# Patient Record
Sex: MALE | Race: WHITE
[De-identification: names, ages, dates, MRNs, and addresses within clinical notes are randomized per-mention and may not be internally consistent; named-entity substitution may affect disease eponyms.]

---

## 2017-07-23 ENCOUNTER — HOSPITAL ENCOUNTER (EMERGENCY)
Dept: HOSPITAL 42 - ED | Age: 37
Discharge: HOME | End: 2017-07-23
Payer: MEDICAID

## 2017-07-23 VITALS — HEART RATE: 94 BPM | SYSTOLIC BLOOD PRESSURE: 138 MMHG | DIASTOLIC BLOOD PRESSURE: 89 MMHG

## 2017-07-23 VITALS — RESPIRATION RATE: 18 BRPM | TEMPERATURE: 98.8 F | OXYGEN SATURATION: 98 %

## 2017-07-23 VITALS — BODY MASS INDEX: 25.8 KG/M2

## 2017-07-23 DIAGNOSIS — K29.20: Primary | ICD-10-CM

## 2017-07-23 LAB
ALBUMIN SERPL-MCNC: 4.8 G/DL (ref 3–4.8)
ALBUMIN/GLOB SERPL: 1.4 {RATIO} (ref 1.1–1.8)
ALT SERPL-CCNC: 180 U/L (ref 7–56)
AMYLASE SERPL-CCNC: 108 U/L (ref 35–125)
APPEARANCE UR: CLEAR
AST SERPL-CCNC: 275 U/L (ref 15–59)
BASOPHILS # BLD AUTO: 0.03 K/MM3 (ref 0–2)
BASOPHILS NFR BLD: 0.7 % (ref 0–3)
BILIRUB UR-MCNC: NEGATIVE MG/DL
BUN SERPL-MCNC: 4 MG/DL (ref 7–21)
CALCIUM SERPL-MCNC: 9.5 MG/DL (ref 8.4–10.5)
COLOR UR: YELLOW
EOSINOPHIL # BLD: 0.1 10*3/UL (ref 0–0.7)
EOSINOPHIL NFR BLD: 1.7 % (ref 1.5–5)
ERYTHROCYTE [DISTWIDTH] IN BLOOD BY AUTOMATED COUNT: 12.5 % (ref 11.5–14.5)
GFR NON-AFRICAN AMERICAN: > 60
GLUCOSE UR STRIP-MCNC: NEGATIVE MG/DL
GRANULOCYTES # BLD: 3.2 10*3/UL (ref 1.4–6.5)
GRANULOCYTES NFR BLD: 69.7 % (ref 50–68)
HGB BLD-MCNC: 14.7 GM/DL (ref 14–18)
LEUKOCYTE ESTERASE UR-ACNC: NEGATIVE LEU/UL
LIPASE SERPL-CCNC: 290 U/L (ref 23–300)
LYMPHOCYTES # BLD: 0.7 10*3/UL (ref 1.2–3.4)
LYMPHOCYTES NFR BLD AUTO: 15.7 % (ref 22–35)
MCH RBC QN AUTO: 34.6 PG (ref 25–35)
MCHC RBC AUTO-ENTMCNC: 36.2 G/DL (ref 31–37)
MCV RBC AUTO: 95.5 FL (ref 80–105)
MONOCYTES # BLD AUTO: 0.6 10*3/UL (ref 0.1–0.6)
MONOCYTES NFR BLD: 12.2 % (ref 1–6)
PH UR STRIP: 6.5 [PH] (ref 4.7–8)
PLATELET # BLD: 117 10^3/UL (ref 120–450)
PMV BLD AUTO: 9.2 FL (ref 7–11)
PROT UR STRIP-MCNC: NEGATIVE MG/DL
RBC # BLD AUTO: 4.25 10^6/UL (ref 3.5–6.1)
RBC # UR STRIP: NEGATIVE /UL
SP GR UR STRIP: <= 1.005 (ref 1–1.03)
URINE NITRATE: NEGATIVE
UROBILINOGEN UR STRIP-ACNC: 0.2 E.U./DL
WBC # BLD AUTO: 4.6 10^3/UL (ref 4.5–11)

## 2017-07-23 PROCEDURE — 80345 DRUG SCREENING BARBITURATES: CPT

## 2017-07-23 PROCEDURE — 80053 COMPREHEN METABOLIC PANEL: CPT

## 2017-07-23 PROCEDURE — 83690 ASSAY OF LIPASE: CPT

## 2017-07-23 PROCEDURE — 81003 URINALYSIS AUTO W/O SCOPE: CPT

## 2017-07-23 PROCEDURE — 80358 DRUG SCREENING METHADONE: CPT

## 2017-07-23 PROCEDURE — 96365 THER/PROPH/DIAG IV INF INIT: CPT

## 2017-07-23 PROCEDURE — 82150 ASSAY OF AMYLASE: CPT

## 2017-07-23 PROCEDURE — 83992 ASSAY FOR PHENCYCLIDINE: CPT

## 2017-07-23 PROCEDURE — 80320 DRUG SCREEN QUANTALCOHOLS: CPT

## 2017-07-23 PROCEDURE — 85025 COMPLETE CBC W/AUTO DIFF WBC: CPT

## 2017-07-23 PROCEDURE — 96375 TX/PRO/DX INJ NEW DRUG ADDON: CPT

## 2017-07-23 PROCEDURE — 80324 DRUG SCREEN AMPHETAMINES 1/2: CPT

## 2017-07-23 PROCEDURE — 80349 CANNABINOIDS NATURAL: CPT

## 2017-07-23 PROCEDURE — 80353 DRUG SCREENING COCAINE: CPT

## 2017-07-23 PROCEDURE — 80361 OPIATES 1 OR MORE: CPT

## 2017-07-23 PROCEDURE — 80346 BENZODIAZEPINES1-12: CPT

## 2017-07-23 PROCEDURE — 99283 EMERGENCY DEPT VISIT LOW MDM: CPT

## 2017-07-23 PROCEDURE — 96372 THER/PROPH/DIAG INJ SC/IM: CPT

## 2017-07-23 NOTE — ED PDOC
Arrival/HPI





- General


Chief Complaint: GI Problem


Time Seen by Provider: 07/23/17 11:22


Historian: Patient, Family





- History of Present Illness


Narrative History of Present Illness (Text): 


07/23/17 11:26


Matt Dodge is a 36 year old male, whose past medical history  includes 

hypertension, presents to the emergency department complaining of constant 

vomiting for the past few days. He notes his vomit is non-bilious, non-bloody 

vomiting, and body tremors. Patient states that on average he drinks 12 beers a 

day (24 oz. each). The patient also reports drinking an hour before prior to 

arrival and had alcohol breath. He states he feels no pain in his abdomen and 

his hands shake when he does not drink. Patient denies chest pain, shortness of 

breath, headache, fever, chills, cough, diarrhea, changes in bowel habits, 

dysuria, hematuria, frequency, flank pain, or other complaints. 


Time/Duration: Other (a few days)


Symptom Onset: Sudden


Symptom Course: Unchanged


Modifying Factors (Text): 


body tremors


Associated Symptoms (Text): 


None





Past Medical History





- Provider Review


Nursing Documentation Reviewed: Yes





- Infectious Disease


Hx of Infectious Diseases: None





- Tetanus Immunization


Tetanus Immunization: Unknown





- Cardiac


Hx Cardiac Disorders: Yes (Palpitations)


Hx Hypertension: Yes





- Pulmonary


Hx Respiratory Disorders: No


Other/Comment: PHARYNGITIS





- Neurological


Hx Neurological Disorder: No





- HEENT


Hx HEENT Disorder: No





- Renal


Hx Renal Disorder: No





- Endocrine/Metabolic


Hx Endocrine Disorders: No





- Hematological/Oncological


Hx Blood Disorders: No





- Integumentary


Hx Dermatological Disorder: No





- Musculoskeletal/Rheumatological


Hx Musculoskeletal Disorders: No


Hx Falls: No





- Gastrointestinal


Hx Gastrointestinal Disorders: No





- Genitourinary/Gynecological


Hx Genitourinary Disorders: No





- Psychiatric


Hx Anxiety: Yes


Hx Substance Use: No (DENIES)


Other/Comment: ETOH ABUSE 10 BEERS A DAY





- Past Surgical History


Past Surgical History: No Previous





- Anesthesia


Hx Anesthesia: No


Hx Anesthesia Reactions: No


Hx Malignant Hyperthermia: No





- Suicidal Assessment


Feels Threatened In Home Enviroment: No





Family/Social History





- Physician Review


Nursing Documentation Reviewed: Yes


Family/Social History: No Known Family HX


Smoking Status: Former Smoker


Hx Alcohol Use: Yes (10 beers a day)


Frequency of alcohol use: Daily


Hx Substance Use: No (DENIES)


Hx Substance Use Treatment: No





Allergies/Home Meds


Allergies/Adverse Reactions: 


Allergies





No Known Allergies Allergy (Verified 07/23/17 11:18)


 








Home Medications: 


 Home Meds











 Medication  Instructions  Recorded  Confirmed


 


Folic Acid [Folic Acid] 1 mg PO DAILY 07/23/17 07/23/17


 


Lisinopril [Zestril] 20 mg PO DAILY 07/23/17 07/23/17














Review of Systems





- Review of Systems


Constitutional: absent: Fevers


Eyes: Normal


ENT: Normal


Respiratory: absent: SOB, Cough


Cardiovascular: Normal.  absent: Chest Pain


Gastrointestinal: Vomiting.  absent: Abdominal Pain, Diarrhea


Genitourinary Male: Normal


Musculoskeletal: Normal


Skin: Normal


Neurological: absent: Headache


Endocrine: absent: Diaphoresis


Hemo/Lymphatic: Normal


Psychiatric: Other (ETOH abuse)





Physical Exam


Vital Signs Reviewed: Yes


Vital Signs











  Temp Pulse Resp BP Pulse Ox


 


 07/23/17 12:53   94 H  18  138/89  98


 


 07/23/17 11:15  98.8 F  104 H  18  147/98 H  98











Temperature: Afebrile


Pulse: Tachycardic


Respiratory Rate: Normal


Appearance: Positive for: Well-Appearing, Comfortable


Pain Distress: None


Mental Status: Positive for: Alert and Oriented X 3





- Systems Exam


Head: Present: Atraumatic, Normocephalic


Pupils: Present: PERRL


Extroacular Muscles: Present: EOMI


Conjunctiva: Present: Normal


Mouth: Present: Moist Mucous Membranes


Neck: Present: Normal Range of Motion


Respiratory/Chest: Present: Clear to Auscultation, Good Air Exchange.  No: 

Respiratory Distress, Accessory Muscle Use


Cardiovascular: Present: Regular Rate and Rhythm, Normal S1, S2.  No: Murmurs


Abdomen: Present: Normal Bowel Sounds, Other (mild hepatomegaly ).  No: 

Tenderness, Distention, Peritoneal Signs


Upper Extremity: Present: Normal Inspection.  No: Cyanosis, Edema


Lower Extremity: Present: Normal Inspection.  No: Edema


Neurological: Present: GCS=15, CN II-XII Intact, Speech Normal


Skin: Present: Warm, Dry, Normal Color.  No: Rashes


Psychiatric: Present: Alert, Oriented x 3, Normal Insight, Normal Concentration





Medical Decision Making


ED Course and Treatment: 


07/23/17 11:26


Impression:


36 year old male with constant vomiting. 





Differential Diagnosis included but are not limited to:  gastritis vs. 

pancreatitis 





Plan:


-- Urinalysis 


-- Labs


-- Pepcid, Magnesium Sulfate, Sodium Chloride, Vitamin B1, and Zofran 


-- Reassess and disposition





Progress Notes:








- Lab Interpretations


Lab Results: 








 07/23/17 12:00 





 07/23/17 12:00 





 Lab Results





07/23/17 13:12: Urine Opiates Screen Negative, Urine Methadone Screen Negative, 

Ur Barbiturates Screen Negative, Ur Phencyclidine Scrn Negative, Ur 

Amphetamines Screen Negative, U Benzodiazepines Scrn Negative, U Oth Cocaine 

Metabols Negative, U Cannabinoids Screen Negative


07/23/17 13:12: Urine Color Yellow, Urine Appearance Clear, Urine pH 6.5, Ur 

Specific Gravity <= 1.005, Urine Protein Negative, Urine Glucose (UA) Negative, 

Urine Ketones Negative, Urine Blood Negative, Urine Nitrate Negative, Urine 

Bilirubin Negative, Urine Urobilinogen 0.2, Ur Leukocyte Esterase Negative


07/23/17 12:00: Alcohol, Quantitative 294 H


07/23/17 12:00: Sodium 130 L, Potassium 4.3, Chloride 88 L, Carbon Dioxide 25, 

Anion Gap 21 H, BUN 4 L, Creatinine 0.6, Est GFR (African Amer) > 60, Est GFR (

Non-Af Amer) > 60, Random Glucose 90, Calcium 9.5, Total Bilirubin 2.6 H, AST 

275 H,  H, Alkaline Phosphatase 109, Total Protein 8.2, Albumin 4.8, 

Globulin 3.4, Albumin/Globulin Ratio 1.4, Amylase 108, Lipase 290


07/23/17 12:00: WBC 4.6  D, RBC 4.25, Hgb 14.7, Hct 40.6 L, MCV 95.5, MCH 34.6, 

MCHC 36.2, RDW 12.5, Plt Count 117 L, MPV 9.2, Gran % 69.7 H, Lymph % (Auto) 

15.7 L, Mono % (Auto) 12.2 H, Eos % (Auto) 1.7, Baso % (Auto) 0.7, Gran # 3.20, 

Lymph # 0.7 L, Mono # 0.6, Eos # 0.1, Baso # 0.03








I have reviewed the lab results: Yes





- Medication Orders


Current Medication Orders: 











Discontinued Medications





Famotidine (Pepcid)  20 mg IVP STAT STA


   Stop: 07/23/17 11:24


   Last Admin: 07/23/17 12:02  Dose: 20 mg





Sodium Chloride (Sodium Chloride 0.9%)  1,000 mls @ 1,000 mls/hr IV .Q1H STA


   Stop: 07/23/17 12:22


   Last Admin: 07/23/17 12:02  Dose: 1,000 mls/hr





Magnesium Sulfate 2 gm/ Sodium (Chloride)  104 mls @ 102 mls/hr IVPB ONCE ONE


   Stop: 07/23/17 12:35


   Last Admin: 07/23/17 12:21  Dose: 102 mls/hr





Ondansetron HCl (Zofran Inj)  8 mg IVP STAT STA


   Stop: 07/23/17 11:24


   Last Admin: 07/23/17 12:00  Dose: 8 mg





Thiamine HCl (Vitamin B1 Inj)  100 mg IM STAT STA


   Stop: 07/23/17 12:46


   Last Admin: 07/23/17 13:05  Dose: 100 mg











- Scribe Statement


The provider has reviewed the documentation as recorded by the Scribe


07/23/2017


Alanis Arevalo





Provider Scribe Attestation:


All medical record entries made by the Scribe were at my direction and 

personally dictated by me. I have reviewed the chart and agree that the record 

accurately reflects my personal performance of the history, physical exam, 

medical decision making, and the department course for this patient. I have 

also personally directed, reviewed, and agree with the discharge instructions 

and disposition. 





Disposition/Present on Arrival





- Present on Arrival


Any Indicators Present on Arrival: No


History of DVT/PE: No


History of Uncontrolled Diabetes: No


Urinary Catheter: No


History of Decub. Ulcer: No


History Surgical Site Infection Following: None





- Disposition


Have Diagnosis and Disposition been Completed?: Yes


Diagnosis: 


 Alcoholic gastritis





Disposition: HOME/ ROUTINE


Disposition Time: 13:00


Condition: IMPROVED


Discharge Instructions (ExitCare):  Gastritis (ED), Abuse of Alcohol (ED)


Additional Instructions: 





Thank you for letting us take care of you today. Your provider was Dr. Saldivar. You were treated for alcohol gastritis. The emergency medical care 

you received today was directed at your acute symptoms. If you were prescribed 

any medication, please fill it and take as directed. It may take several days 

for your symptoms to resolve. Return to the Emergency Department if your 

symptoms worsen, do not improve, or if you have any other problems.





Please contact your doctor or call one of the physicians/clinics you have been 

referred to that are listed on the Patient Visit Information form that is 

included in your discharge packet. Bring any paperwork you were given at 

discharge with you along with any medications you are taking to your follow up 

visit. Our treatment cannot replace ongoing medical care by a primary care 

provider (PCP) outside of the emergency department.





Thank you for allowing the [facility] team to be part of your care today.














Follow up with your doctor in 2-3 days for re-evaluation and further management.





You need to decrease the amount of alcohol you drink daily.  Consider detox 

programs or AA.


Referrals: 


[facility], [Non-Staff] - Follow up with primary

## 2017-09-05 ENCOUNTER — HOSPITAL ENCOUNTER (INPATIENT)
Dept: HOSPITAL 42 - ED | Age: 37
LOS: 6 days | Discharge: HOME | DRG: 750 | End: 2017-09-11
Attending: INTERNAL MEDICINE | Admitting: INTERNAL MEDICINE
Payer: MEDICAID

## 2017-09-05 VITALS — BODY MASS INDEX: 25 KG/M2

## 2017-09-05 DIAGNOSIS — K52.9: ICD-10-CM

## 2017-09-05 DIAGNOSIS — I10: ICD-10-CM

## 2017-09-05 DIAGNOSIS — R30.0: ICD-10-CM

## 2017-09-05 DIAGNOSIS — E87.6: ICD-10-CM

## 2017-09-05 DIAGNOSIS — Z78.1: ICD-10-CM

## 2017-09-05 DIAGNOSIS — Z87.891: ICD-10-CM

## 2017-09-05 DIAGNOSIS — Z91.19: ICD-10-CM

## 2017-09-05 DIAGNOSIS — F41.9: ICD-10-CM

## 2017-09-05 DIAGNOSIS — B96.1: ICD-10-CM

## 2017-09-05 DIAGNOSIS — F10.231: Primary | ICD-10-CM

## 2017-09-05 DIAGNOSIS — N30.00: ICD-10-CM

## 2017-09-05 LAB
ALBUMIN/GLOB SERPL: 1.6 {RATIO} (ref 1.1–1.8)
ALP SERPL-CCNC: 96 U/L (ref 38–126)
ALT SERPL-CCNC: 75 U/L (ref 7–56)
AST SERPL-CCNC: 95 U/L (ref 17–59)
BASOPHILS # BLD AUTO: 0.02 K/MM3 (ref 0–2)
BASOPHILS NFR BLD: 0.4 % (ref 0–3)
BILIRUB SERPL-MCNC: 2.9 MG/DL (ref 0.2–1.3)
BUN SERPL-MCNC: 4 MG/DL (ref 7–21)
CALCIUM SERPL-MCNC: 9.7 MG/DL (ref 8.4–10.5)
CHLORIDE SERPL-SCNC: 88 MMOL/L (ref 98–107)
CO2 SERPL-SCNC: 22 MMOL/L (ref 21–33)
EOSINOPHIL # BLD: 0 10*3/UL (ref 0–0.7)
EOSINOPHIL NFR BLD: 0.2 % (ref 1.5–5)
ERYTHROCYTE [DISTWIDTH] IN BLOOD BY AUTOMATED COUNT: 12.4 % (ref 11.5–14.5)
GLOBULIN SER-MCNC: 3.2 GM/DL
GLUCOSE SERPL-MCNC: 89 MG/DL (ref 70–110)
GRANULOCYTES # BLD: 3.86 10*3/UL (ref 1.4–6.5)
GRANULOCYTES NFR BLD: 71.7 % (ref 50–68)
HCT VFR BLD CALC: 39.1 % (ref 42–52)
LIPASE SERPL-CCNC: 209 U/L (ref 23–300)
LYMPHOCYTES # BLD: 0.8 10*3/UL (ref 1.2–3.4)
LYMPHOCYTES NFR BLD AUTO: 14.5 % (ref 22–35)
MCH RBC QN AUTO: 35.4 PG (ref 25–35)
MCHC RBC AUTO-ENTMCNC: 37.6 G/DL (ref 31–37)
MCV RBC AUTO: 94.2 FL (ref 80–105)
MONOCYTES # BLD AUTO: 0.7 10*3/UL (ref 0.1–0.6)
MONOCYTES NFR BLD: 13.2 % (ref 1–6)
PLATELET # BLD: 139 10^3/UL (ref 120–450)
PMV BLD AUTO: 8.8 FL (ref 7–11)
POTASSIUM SERPL-SCNC: 3.8 MMOL/L (ref 3.6–5)
PROT SERPL-MCNC: 8.2 G/DL (ref 5.8–8.3)
SODIUM SERPL-SCNC: 130 MMOL/L (ref 132–148)
WBC # BLD AUTO: 5.4 10^3/UL (ref 4.5–11)

## 2017-09-05 NOTE — ED PDOC
Arrival/HPI





<Mark Arora - Last Filed: 09/05/17 21:27>





<Arthur Schilling - Last Filed: 09/06/17 00:16>





- General


Chief Complaint: Alcohol Ingestion


Time Seen by Provider: 09/05/17 19:44





- History of Present Illness


Narrative History of Present Illness (Text): 





09/05/17 20:16


36 year old male with hx of alcohol abuse presents to the ED with CC of 

abdominal pain. The patient started yesterday when the patient woke up around 

10am. The pain is described as cramping and squeezing in nature. The pain has 

been constant since yesterday and worsening in pain. The patient states that 

the pain is located in the epigastric area, radiating to his lower abdomen, 

back and right shoulder. Patient reports that he normally drinks 10 beers a day

, but was only able to drink 5 today because he has been vomiting. He has not 

been able to keep anything down since yesterday. He attempted to eat today but 

vomited every time he ate today. He states that his vomit contains only his food

, denies any blood or coffee ground emesis. Patient reports multiple episodes 

of watery diarrhea since yesterday, denies blood. Patient reports subjective 

fever and chills. He also reports tremors, "I shake when I don't drink." 





PMH: HTN, Alcohol abuse (Mark Arora)





Past Medical History





- Provider Review


Nursing Documentation Reviewed: Yes





- Infectious Disease


Hx of Infectious Diseases: None





- Tetanus Immunization


Tetanus Immunization: Unknown





- Cardiac


Hx Cardiac Disorders: Yes (Palpitations)


Hx Hypertension: Yes





- Pulmonary


Hx Respiratory Disorders: No


Other/Comment: PHARYNGITIS





- Neurological


Hx Neurological Disorder: No





- HEENT


Hx HEENT Disorder: No





- Renal


Hx Renal Disorder: No





- Endocrine/Metabolic


Hx Endocrine Disorders: No





- Hematological/Oncological


Hx Blood Disorders: No





- Integumentary


Hx Dermatological Disorder: No





- Musculoskeletal/Rheumatological


Hx Musculoskeletal Disorders: No


Hx Falls: No





- Gastrointestinal


Hx Gastrointestinal Disorders: No





- Genitourinary/Gynecological


Hx Genitourinary Disorders: No





- Psychiatric


Hx Anxiety: Yes


Hx Substance Use: No (DENIES)


Other/Comment: ETOH ABUSE 10 BEERS A DAY





- Past Surgical History


Past Surgical History: No Previous





- Anesthesia


Hx Anesthesia: No


Hx Anesthesia Reactions: No


Hx Malignant Hyperthermia: No





- Suicidal Assessment


Feels Threatened In Home Enviroment: No





<Mark Arora - Last Filed: 09/05/17 21:27>





Family/Social History





- Physician Review


Nursing Documentation Reviewed: Yes


Family/Social History: Unknown Family HX


Smoking Status: Former Smoker


Hx Alcohol Use: Yes (10 beers a day)


Frequency of alcohol use: Daily


Hx Substance Use: No (DENIES)


Hx Substance Use Treatment: No





<Mark Arora - Last Filed: 09/05/17 21:27>





Allergies/Home Meds





<Mark Arora - Last Filed: 09/05/17 21:27>





<Arthur Schilling - Last Filed: 09/06/17 00:16>


Allergies/Adverse Reactions: 


Allergies





No Known Allergies Allergy (Verified 09/05/17 19:35)


 








Home Medications: 


 Home Meds











 Medication  Instructions  Recorded  Confirmed


 


Lisinopril [Zestril] 20 mg PO DAILY 07/23/17 09/05/17


 


Mv-Min/Folic/Vit K/Lycop/Coq10 1 tab PO DAILY 09/05/17 09/05/17





[Daily Multivitamin Capsule]   


 


Thiamine HCl [B-1] 1 tab PO DAILY 09/05/17 09/05/17


 


hydroCHLOROthiazide [Hydrodiuril] 25 mg PO DAILY 09/05/17 09/05/17














Review of Systems





- Physician Review


All systems were reviewed & negative as marked: Yes





- Review of Systems


Constitutional: Fevers (subjective. experiencing chills as well)


Eyes: Normal


ENT: Sore Throat


Respiratory: absent: SOB, Cough


Cardiovascular: absent: Chest Pain, Palpitations, Edema, Syncope


Gastrointestinal: Abdominal Pain (epigastric area, radiating to RLQ/LLQ, back 

and right shoulder), Diarrhea, Nausea, Vomiting.  absent: Constipation, 

Hematochezia, Hematemesis


Genitourinary Male: Normal


Musculoskeletal: Normal


Skin: Normal


Neurological: Normal


Endocrine: Normal


Psychiatric: Depression





<Mark Arora - Last Filed: 09/05/17 21:27>





Physical Exam


Vital Signs Reviewed: Yes


Temperature: Afebrile


Blood Pressure: Normal


Pulse: Regular


Respiratory Rate: Normal


Appearance: Positive for: Uncomfortable (actively dry heaving )


Pain Distress: None


Mental Status: Positive for: Alert and Oriented X 3





- Systems Exam


Head: Present: Atraumatic, Normocephalic


Pupils: Present: PERRL


Extroacular Muscles: Present: EOMI


Conjunctiva: Present: Icteric


Mouth: Present: Moist Mucous Membranes


Nose (External): Present: Atraumatic.  No: Abrasion


Neck: Present: Normal Range of Motion.  No: MIDLINE TENDERNESS, JVD


Respiratory/Chest: Present: Clear to Auscultation.  No: Respiratory Distress, 

Accessory Muscle Use


Cardiovascular: Present: Regular Rate and Rhythm, Normal S1, S2


Abdomen: Present: Tenderness (diffuse), Guarding.  No: Distention, Peritoneal 

Signs, McBurney's Point Tender, Rovsing's Sign Present


Back: No: CVA Tenderness


Upper Extremity: Present: Normal Inspection, NORMAL PULSES, Other (Tremors with 

hands extended. ).  No: Edema


Lower Extremity: Present: Normal Inspection, NORMAL PULSES.  No: Edema, CALF 

TENDERNESS


Neurological: Present: CN II-XII Intact, Speech Normal, Motor Func Grossly 

Intact (5/5 strength UE/LE b/l), Normal Cerebellar Funct (normal finger to nose

, heel to shin)


Skin: Present: Warm, Dry, Normal Color


Psychiatric: Present: Alert, Oriented x 3, Normal Insight, Normal Concentration

, Depressed Mood ("I drink because I'm depressed"), Intoxicated (Reports 

drinking 5 beers today)





<Mark Arora - Last Filed: 09/05/17 21:27>





Medical Decision Making





<Mark Arora - Last Filed: 09/05/17 21:27>





<Arthur Schilling - Last Filed: 09/06/17 00:16>


ED Course and Treatment: 





09/05/17 20:39





Abdominal Pain


   - Lipase - 209 - normal


   - Alcohol - 74 - patient actively shaking. 


   - Ativan 2mg IVP given





 (Mark Arora)








09/05/17 20:45


Pt. seen and evaluated with the medical resident.Agree with HPI,clinical 

assesssment,evaluation and treatment plan.


09/05/17 21:41


Pt. with signs/symptoms of alcohol withdrawal.Ativan was given.Case was 

discussed with medical resident and house MD Barclay who accepts to 

hospitalist service.


 (Arthur Schilling)





- Lab Interpretations


Lab Results: 








 09/05/17 20:09 





 09/05/17 20:09 





 Lab Results





09/05/17 20:09: Alcohol, Quantitative 74 H


09/05/17 20:09: Sodium 130 L, Potassium 3.8, Chloride 88 L, Carbon Dioxide 22, 

Anion Gap 24 H, BUN 4 L, Creatinine 0.7, Est GFR (African Amer) > 60, Est GFR (

Non-Af Amer) > 60, Random Glucose 89, Calcium 9.7, Total Bilirubin 2.9 H, AST 

95 H, ALT 75 H, Alkaline Phosphatase 96, Total Protein 8.2, Albumin 5.0 H, 

Globulin 3.2, Albumin/Globulin Ratio 1.6, Lipase 209


09/05/17 20:09: WBC 5.4, RBC 4.15, Hgb 14.7, Hct 39.1 L, MCV 94.2, MCH 35.4 H, 

MCHC 37.6 H, RDW 12.4, Plt Count 139, MPV 8.8, Gran % 71.7 H, Lymph % (Auto) 

14.5 L, Mono % (Auto) 13.2 H, Eos % (Auto) 0.2 L, Baso % (Auto) 0.4, Gran # 3.86

, Lymph # 0.8 L, Mono # 0.7 H, Eos # 0.0, Baso # 0.02











- Medication Orders


Current Medication Orders: 








Famotidine (Pepcid)  20 mg PO DAILY JOVANA


Folic Acid (Folic Acid)  1 mg PO DAILY JOVANA


Sodium Chloride (Sodium Chloride 0.9%)  1,000 mls @ 125 mls/hr IV .Q8H JOVANA


Lorazepam (Ativan)  1 mg IVP Q2H PRN; Protocol


   PRN Reason: Symptoms of alcohol withdrawl


Multivitamins/Vitamin C (Multi-Delyn Liquid)  15 ml PO 0800 JOVANA


Thiamine HCl (Vitamin B1 Tab)  50 mg PO DAILY JOVANA





Discontinued Medications





Famotidine (Pepcid)  20 mg IVP STAT STA


   Stop: 09/05/17 20:16


   Last Admin: 09/05/17 20:41  Dose: 20 mg





Multivitamins/Vitamin C 10 ml/Thiamine HCl 100 mg/ Folic Acid 1 mg/ Sodium 

Chloride  1,011.2 mls @ 1,000 mls/hr IV .Q1H1M ONE


   Stop: 09/05/17 21:13


   Last Admin: 09/05/17 21:18  Dose: 1,000 mls/hr





Lorazepam (Ativan)  2 mg IVP ONCE ONE


   PRN Reason: Protocol


   Stop: 09/05/17 20:13


   Last Admin: 09/05/17 20:41  Dose: 2 mg





Ondansetron HCl (Zofran Inj)  4 mg IVP STAT STA


   Stop: 09/05/17 20:16


   Last Admin: 09/05/17 20:41  Dose: 4 mg











- PA / NP / Resident Statement


CINDI has reviewed & agrees with the documentation as recorded.


CINDI has examined the patient and agrees with the treatment plan.





<Arthur Schilling - Last Filed: 09/06/17 00:16>





Disposition/Present on Arrival





- Present on Arrival


History of DVT/PE: No


History of Uncontrolled Diabetes: No


Urinary Catheter: No


History of Decub. Ulcer: No


History Surgical Site Infection Following: None





<Mark Arora - Last Filed: 09/05/17 21:27>





- Present on Arrival


Any Indicators Present on Arrival: No


History of DVT/PE: No


History of Uncontrolled Diabetes: No


Urinary Catheter: No


History of Decub. Ulcer: No


History Surgical Site Infection Following: None





- Disposition


Have Diagnosis and Disposition been Completed?: Yes


Disposition Time: 21:41


Patient Plan: Observation





<Arthur Schilling - Last Filed: 09/06/17 00:16>





- Disposition


Diagnosis: 


 Alcohol withdrawal





Disposition: HOSPITALIZED


Patient Problems: 


 Current Active Problems











Problem Status Onset


 


Alcohol withdrawal Chronic  











Condition: STABLE


Referrals: 


[facility] Mary Valenzuela, [Primary Care Provider] - Follow up with primary


Forms:  [employer] (English)

## 2017-09-06 LAB
APPEARANCE UR: (no result)
BACTERIA #/AREA URNS HPF: (no result) /[HPF]
BILIRUB UR-MCNC: NEGATIVE MG/DL
COLOR UR: (no result)
EPI CELLS #/AREA URNS HPF: (no result) /HPF (ref 0–5)
GLUCOSE UR STRIP-MCNC: NEGATIVE MG/DL
KETONES UR STRIP-MCNC: (no result) MG/DL
LEUKOCYTE ESTERASE UR-ACNC: (no result) LEU/UL
MAGNESIUM SERPL-MCNC: 1.7 MG/DL (ref 1.7–2.2)
PH UR STRIP: 7 [PH] (ref 4.7–8)
PHOSPHATE SERPL-MCNC: 3.4 MG/DL (ref 2.5–4.5)
PROT UR STRIP-MCNC: NEGATIVE MG/DL
RBC # UR STRIP: (no result) /UL
RBC #/AREA URNS HPF: (no result) /HPF (ref 0–2)
SP GR UR STRIP: <= 1.005 (ref 1–1.03)
UROBILINOGEN UR STRIP-ACNC: 0.2 E.U./DL

## 2017-09-06 NOTE — CARD
--------------- APPROVED REPORT --------------





EKG Measurement

Heart [id number]

OR [id number]

[id number]

[id number]

[id number]



<Conclusion>

Sinus rhythm with occasional premature ventricular complexes

Otherwise normal ECG

## 2017-09-06 NOTE — CP.PCM.PN
<Kobe Allison - Last Filed: 09/06/17 15:41>





Subjective





- Date & Time of Evaluation


Date of Evaluation: 09/06/17


Time of Evaluation: 10:32





- Subjective


Subjective: 


Patient was seen and examined at bedside. He reports feeling anxious, and still 

feels tremulous. He states that he has tried to quit drinking in the past, 

however, he continues to drink due to depression and anxiety. He denies 

additional nausea, vomiting, diarrhea, fevers, chills, blurry vision, CP, SOB, 

cough, palpitations, dizziness, weakness, leg swelling, dysuria, or other 

complaints. 





PMH: HTN, Alcohol abuse 


PSH: denies 


All: NKDA 


Meds: Reviewed 


Social history: Drinks beer daily, quit smoking, denies illicit drugs, lives 

with a friend, ambulates by self, able to normally perform ADLs independently, 

unemployed








Objective





- Vital Signs/Intake and Output


Vital Signs (last 24 hours): 


 











Temp Pulse Resp BP Pulse Ox


 


 98.2 F   103 H  20   116/81   98 


 


 09/06/17 11:55  09/06/17 11:55  09/06/17 11:55  09/06/17 11:55  09/06/17 06:00








Intake and Output: 


 











 09/06/17 09/06/17





 06:59 18:59


 


Intake Total 500 0


 


Output Total 350 500


 


Balance 150 -500














- Medications


Medications: 


 Current Medications





Famotidine (Pepcid)  20 mg PO DAILY [facility]


   Last Admin: 09/06/17 12:59 Dose:  Not Given


Folic Acid (Folic Acid)  1 mg PO DAILY [facility]


   Last Admin: 09/06/17 12:59 Dose:  Not Given


Multivitamins/Vitamin C 10 ml/Thiamine HCl 100 mg/ Folic Acid 1 mg/ Sodium 

Chloride  1,011.2 mls @ 100 mls/hr IV .Q10H7M ONE


   Stop: 09/06/17 22:09


   Last Admin: 09/06/17 14:18 Dose:  100 mls/hr


Lorazepam (Ativan)  2 mg IVP Q2H PRN; Protocol


   PRN Reason: Symptoms of alcohol withdrawl


Ondansetron HCl (Zofran Inj)  2 mg IVP Q4H PRN


   PRN Reason: Nausea/Vomiting











- Head Exam


Head Exam: ATRAUMATIC, NORMAL INSPECTION, NORMOCEPHALIC





- Eye Exam


Eye Exam: EOMI, Normal appearance, PERRL.  absent: Scleral icterus


Pupil Exam: NORMAL ACCOMODATION, PERRL





- ENT Exam


ENT Exam: Mucous Membranes Moist





- Neck Exam


Neck Exam: Full ROM, Normal Inspection





- Respiratory Exam


Respiratory Exam: Clear to Ausculation Bilateral, NORMAL BREATHING PATTERN.  

absent: Chest Wall Tenderness, Respiratory Distress





- Cardiovascular Exam


Cardiovascular Exam: REGULAR RHYTHM, +S1, +S2.  absent: JVD, Rubs





- GI/Abdominal Exam


GI & Abdominal Exam: Soft, Normal Bowel Sounds.  absent: Rigid, Tenderness





- Extremities Exam


Extremities Exam: Normal Inspection.  absent: Joint Swelling





- Back Exam


Back Exam: NORMAL INSPECTION, paraspinal tenderness





- Psychiatric Exam


Psychiatric exam: Anxious





- Skin


Skin Exam: Dry, Intact, Normal Color





Assessment and Plan





- Assessment and Plan (Free Text)


Assessment: 





Alcohol withdrawal ass/w anxiety


-Patient a chronic alcoholic.


-Multiple admissions with alcohol.


-Ativan 2mg PRN and CIWA score now 2(Original 7 upon admission).


-Continue Banana bag, folate.


-Discussed with patient if he was ready to quit drinking.  Patient is open to 

get help for his drinking problem.  Will discuss more with the patient tomorrow.


-Fall protocols and Seizure protocols.





Dysuria


-U/A showed (+)Leukocyte Esterase and blood. Urine Culture Pending.


-Start Rocephin





Nausea and vomiting 2/2 likely to gastroenteritis


-Patient received 1L NS banana bag and zofran in ED.


-Patient symptoms resolved.  Abdominal exam benign.


-Phosphorus(3.4) and Magnesium (1.7)


-Continue Zofran PRN.





Transaminitis


-most likely due to chronic alcohol abuse.


-AST:95 and ALT:75





Hypertension


-continue home medications





GI ppx


-Continue Protonix





DVT ppx


-SCD's





<Obdulio Irene - Last Filed: 09/06/17 17:59>





Objective





- Vital Signs/Intake and Output


Vital Signs (last 24 hours): 


 











Temp Pulse Resp BP Pulse Ox


 


 98.2 F   103 H  20   116/81   98 


 


 09/06/17 11:55  09/06/17 11:55  09/06/17 11:55  09/06/17 11:55  09/06/17 06:00








Intake and Output: 


 











 09/06/17 09/06/17





 06:59 18:59


 


Intake Total 500 0


 


Output Total 350 500


 


Balance 150 -500














- Medications


Medications: 


 Current Medications





Famotidine (Pepcid)  20 mg PO DAILY [facility]


   Last Admin: 09/06/17 12:59 Dose:  Not Given


Folic Acid (Folic Acid)  1 mg PO DAILY [facility]


   Last Admin: 09/06/17 12:59 Dose:  Not Given


Multivitamins/Vitamin C 10 ml/Thiamine HCl 100 mg/ Folic Acid 1 mg/ Sodium 

Chloride  1,011.2 mls @ 100 mls/hr IV .Q10H7M ONE


   Stop: 09/06/17 22:09


   Last Admin: 09/06/17 14:18 Dose:  100 mls/hr


Lorazepam (Ativan)  2 mg IVP Q2H PRN; Protocol


   PRN Reason: Symptoms of alcohol withdrawl


   Last Admin: 09/06/17 16:45 Dose:  2 mg


Lorazepam (Ativan)  2 mg IVP Q6H JOVANA


   PRN Reason: Protocol


   Last Admin: 09/06/17 17:30 Dose:  Not Given


Ondansetron HCl (Zofran Inj)  2 mg IVP Q4H PRN


   PRN Reason: Nausea/Vomiting











Attending/Attestation





- Attestation


I have personally seen and examined this patient.: Yes


I have fully participated in the care of the patient.: Yes


I have reviewed all pertinent clinical information, including history, physical 

exam and plan: Yes


Notes (Text): 





09/06/17 17:57





attending note;





Patient seen and examined with the resident.





Patient is a 36-year-old well-known to our service is admitted to alcohol 

withdrawal.


Continue IV banana bag and IV Ativan.


[facility] protocol.


Depression; psychiatric evaluation requested.


 evaluation requested for AA rehabilition/AA meeting  information.


Noncompliance with follow up.





Diagnosis and treatment plan discussed with patient's significant other in 

detail.

## 2017-09-06 NOTE — CP.PCM.HP
<BETYAFTAB - Last Filed: 09/06/17 00:15>





History of Present Illness





- History of Present Illness


History of Present Illness: 


Aftab Rodriguez, PGY1, H&P for Dr. Blas:





CC: diffuse abdominal pain





HPI: 35M with PMH HTN, alcoholism, presents for diffuse abdominal pain x 2 days 

PTA. He descirbes it crampy, squeezing, constant, denies radiation, worsening 

today, with associated multiple nonbloody, nonbilious vomiting and watery 

diarrhea today.  Vomiting occurs right after food intake, has chronic + 

tremors. Denies fever, chills, blurry vision, cp, sob, cough, palpitations, 

dizziness, weakness, leg swelling. he c/o dysuria, denies frequency, urgency, 

hematuria, or back pain.





[place] [facility] [place].


PMD:  Dr. Vanessa


PMH:  hypertension, alcohol abuse


PSH:  denies 


All: NKA


Home Medications:  reviewed


Social History:  lives with friend; ambulates by self, able to do his ADLs, 

quit smoking 8 months ago; drinks 10 beers daily,denies illicit drug use; 

unemployed








Present on Admission





- Present on Admission


Any Indicators Present on Admission: No


History of DVT/PE: No


History of Uncontrolled Diabetes: No


Urinary Catheter: No


Decubitus Ulcer Present: No


History Surgical Site Infection Following: None





Review of Systems





- Review of Systems


All systems: reviewed and no additional remarkable complaints except


Review of Systems: 





as per hpi





Past Patient History





- Infectious Disease


Hx of Infectious Diseases: None





- Tetanus Immunizations


Tetanus Immunization: Unknown





- Past Social History


Smoking Status: Former Smoker





- CARDIAC


Hx Cardiac Disorders: Yes (Palpitations)


Hx Hypertension: Yes





- PULMONARY


Hx Respiratory Disorders: No


Other/Comment: PHARYNGITIS





- NEUROLOGICAL


Hx Neurological Disorder: No





- HEENT


Hx HEENT Problems: No





- RENAL


Hx Chronic Kidney Disease: No





- ENDOCRINE/METABOLIC


Hx Endocrine Disorders: No





- HEMATOLOGICAL/ONCOLOGICAL


Hx Blood Disorders: No





- INTEGUMENTARY


Hx Dermatological Problems: No





- MUSCULOSKELETAL/RHEUMATOLOGICAL


Hx Musculoskeletal Disorders: No


Hx Falls: No





- GASTROINTESTINAL


Hx Gastrointestinal Disorders: No





- GENITOURINARY/GYNECOLOGICAL


Hx Genitourinary Disorders: No





- PSYCHIATRIC


Hx Anxiety: Yes


Hx Substance Use: No (DENIES)


Other/Comment: ETOH ABUSE 10 BEERS A DAY





- SURGICAL HISTORY


Hx Surgeries: No





- ANESTHESIA


Hx Anesthesia: No


Hx Anesthesia Reactions: No


Hx Malignant Hyperthermia: No





Meds


Allergies/Adverse Reactions: 


 Allergies











Allergy/AdvReac Type Severity Reaction Status Date / Time


 


No Known Allergies Allergy   Verified 09/05/17 19:35














Physical Exam





- Constitutional


Appears: Non-toxic, No Acute Distress, Unkempt, Older Than Stated Age





- Head Exam


Head Exam: ATRAUMATIC, NORMOCEPHALIC





- Eye Exam


Eye Exam: EOMI, PERRL.  absent: Conjunctival injection, Scleral icterus


Pupil Exam: NORMAL ACCOMODATION, PERRL





- ENT Exam


ENT Exam: Mucous Membranes Dry





- Neck Exam


Neck exam: Positive for: Normal Inspection.  Negative for: Lymphadenopathy, 

Thyromegaly





- Respiratory Exam


Respiratory Exam: Clear to Auscultation Bilateral, NORMAL BREATHING PATTERN.  

absent: Accessory Muscle Use, Rales, Rhonchi, Wheezes





- Cardiovascular Exam


Cardiovascular Exam: RRR, +S1, +S2.  absent: Bradycardia, Tachycardia, Systolic 

Murmur





- GI/Abdominal Exam


GI & Abdominal Exam: Guarding, Normal Bowel Sounds, Tenderness.  absent: Firm, 

Rebound, Rigid


Additional comments: 


Diffusely TTP, + suprapubic tenderness








- Extremities Exam


Extremities exam: Positive for: normal inspection, pedal pulses present.  

Negative for: calf tenderness, pedal edema





- Back Exam


Back exam: absent: CVA tenderness (L), CVA tenderness (R)





- Neurological Exam


Neurological exam: Alert, Oriented x3





- Psychiatric Exam


Psychiatric exam: Depressed





- Skin


Skin Exam: Dry, Normal Color, Warm





Results





- Vital Signs


Recent Vital Signs: 





 Last Vital Signs











Temp  99.1 F   09/05/17 19:39


 


Pulse  88   09/05/17 19:39


 


Resp  19   09/05/17 19:39


 


BP  127/86   09/05/17 19:39


 


Pulse Ox  98   09/05/17 19:39














- Labs


Result Diagrams: 


 09/05/17 20:09





 09/05/17 20:09


Labs: 





 Laboratory Results - last 24 hr











  09/05/17 09/05/17 09/05/17





  20:09 20:09 20:09


 


WBC  5.4  


 


RBC  4.15  


 


Hgb  14.7  


 


Hct  39.1 L  


 


MCV  94.2  


 


MCH  35.4 H  


 


MCHC  37.6 H  


 


RDW  12.4  


 


Plt Count  139  


 


MPV  8.8  


 


Gran %  71.7 H  


 


Lymph % (Auto)  14.5 L  


 


Mono % (Auto)  13.2 H  


 


Eos % (Auto)  0.2 L  


 


Baso % (Auto)  0.4  


 


Gran #  3.86  


 


Lymph #  0.8 L  


 


Mono #  0.7 H  


 


Eos #  0.0  


 


Baso #  0.02  


 


Sodium   130 L 


 


Potassium   3.8 


 


Chloride   88 L 


 


Carbon Dioxide   22 


 


Anion Gap   24 H 


 


BUN   4 L 


 


Creatinine   0.7 


 


Est GFR ( Amer)   > 60 


 


Est GFR (Non-Af Amer)   > 60 


 


Random Glucose   89 


 


Calcium   9.7 


 


Total Bilirubin   2.9 H 


 


AST   95 H 


 


ALT   75 H 


 


Alkaline Phosphatase   96 


 


Total Protein   8.2 


 


Albumin   5.0 H 


 


Globulin   3.2 


 


Albumin/Globulin Ratio   1.6 


 


Lipase   209 


 


Alcohol, Quantitative    74 H














Assessment & Plan





- Assessment and Plan (Free Text)


Assessment: 


35 year old male with past medical history of chronic ETOH abuse and 

hypertension presents today with complaint of diffuse abdominal pain, multiple 

episodes of nausea and nonbilious, nonbloody vomiting x 1 day (likely 

gastroenteritis) and alcohol withdrawal.





Plan: 





Nausea and vomiting 2/2 likely gastroenteritis:


- Diffuse abdominal tenderness on exam and + suprapubic tenderness as well. C/o 

mild dysuria


- NPO. IVF. Received 1L NS banana bag and zofran in ED. 


- Zofran prn


- f/u Mg, Phosp





Dysuria: 


- Check UA, Urine culture.





Alcohol withdrawal:


- Pt a chronic alcoholic.


- multiple admissions for alcohol withdrawal


- Ativan prn, CIWA protocol


- Received banana bag in ED


- MVT, folate, thiamine q day





Transaminitis:


- Likely secondary to chronic etoh abuse.


- Recent hepatitis panel was negative; Abd US 4/2017 showed severe fatty 

infiltration of liver. 


- Monitor LFTs





HTN: c/w home meds





Patient is on pepcid for GI prophylaxis and SCDs for DVT prophylaxis.





Discussed with Dr. Blas.


Aftab Rodriguez, PGY1








- Date & Time


Date: 09/06/17


Time: 00:21





<Bibiana Blas - Last Filed: 09/06/17 08:09>





Results





- Vital Signs


Recent Vital Signs: 





 Last Vital Signs











Temp  98.8 F   09/06/17 06:00


 


Pulse  85   09/06/17 06:00


 


Resp  20   09/06/17 06:00


 


BP  124/78   09/06/17 06:00


 


Pulse Ox  98   09/06/17 06:00














- Labs


Result Diagrams: 


 09/05/17 20:09





 09/05/17 20:09





Attending/Attestation





- Attestation


I have personally seen and examined this patient.: Yes


I have fully participated in the care of the patient.: Yes


I have reviewed all pertinent clinical information: Yes


Notes (Text): 





09/06/17 08:07


Patient was seen when he was  in bed # 6 in the ER.


Agree with history , physical examination, assessment and  plan.

## 2017-09-07 LAB
ALBUMIN/GLOB SERPL: 1.5 {RATIO} (ref 1.1–1.8)
ALP SERPL-CCNC: 83 U/L (ref 38–126)
ALT SERPL-CCNC: 69 U/L (ref 7–56)
AST SERPL-CCNC: 93 U/L (ref 17–59)
BILIRUB SERPL-MCNC: 3.4 MG/DL (ref 0.2–1.3)
BUN SERPL-MCNC: 6 MG/DL (ref 7–21)
CALCIUM SERPL-MCNC: 9.5 MG/DL (ref 8.4–10.5)
CHLORIDE SERPL-SCNC: 101 MMOL/L (ref 98–107)
CO2 SERPL-SCNC: 19 MMOL/L (ref 21–33)
ERYTHROCYTE [DISTWIDTH] IN BLOOD BY AUTOMATED COUNT: 12.4 % (ref 11.5–14.5)
GLOBULIN SER-MCNC: 3 GM/DL
GLUCOSE SERPL-MCNC: 91 MG/DL (ref 70–110)
HCT VFR BLD CALC: 38.2 % (ref 42–52)
MAGNESIUM SERPL-MCNC: 1.8 MG/DL (ref 1.7–2.2)
MCH RBC QN AUTO: 35.2 PG (ref 25–35)
MCHC RBC AUTO-ENTMCNC: 36.4 G/DL (ref 31–37)
MCV RBC AUTO: 96.7 FL (ref 80–105)
PLATELET # BLD: 114 10^3/UL (ref 120–450)
PMV BLD AUTO: 8.9 FL (ref 7–11)
POTASSIUM SERPL-SCNC: 3.5 MMOL/L (ref 3.6–5)
PROT SERPL-MCNC: 7.6 G/DL (ref 5.8–8.3)
SODIUM SERPL-SCNC: 136 MMOL/L (ref 132–148)
WBC # BLD AUTO: 5.2 10^3/UL (ref 4.5–11)

## 2017-09-07 PROCEDURE — HZ2ZZZZ DETOXIFICATION SERVICES FOR SUBSTANCE ABUSE TREATMENT: ICD-10-PCS

## 2017-09-07 NOTE — PCM.RRT
<JACKELYN AVERY - Last Filed: 09/07/17 01:59>





RRT Nurse Assessment





- Situation


Date: 09/07/17


Time RRT was called: 01:26


RRT Responder Arrival Time: 01:28


RRT Location:: [address] [place] [address]


Room Number: 268


RRT Reason for Call: Tachycardia, Looks Sicker


RRT Called By: RN





- IV


IV Inserted during RRT?: No





- Respiratory


Oxygen Delivery Method: Room Air


Received Nebulizer Treatments:: No


Was the Patient Ventilated with Bag/Mask 100% O2?: No


Secretions Suctioned?: No


Was the Patient Intubated?: No


Was the Patient Placed on a Ventilator?: No





- Medication


Medications Administered During RRT: Geodon 10 mg IM, Librium 50 mg PO





- Diagnostic Test Ordered


EKG: No


Chest X-Ray: No


CT Scan: No





- Stat Labs Ordered


RRT Stat Labs Ordered: CBC


RRT Other Labs Ordered: CMP, MG


CPR started during RRT?: No





- Vital Signs


Vital Sign: 


Rapid Response Vital Sign





Blood Pressure                   138/86


Pulse Rate                       216


Respiratory Rate                 24


Temperature                      99.2 F


Oxygen Saturation                95











- Cicero Coma Scale


Coma Scale Eye Opening: Spontaneous


Coma Scale Motor: Obeys Commands Movement


Coma Scale Verbal: Confused/able to answer





- Time RRT Ended


Time RRT Ended: 01:45





- Vital Signs at end of RRT


Vital Signs at end of RRT: 


Rapid Response End Vital Sign





Blood Pressure                   145/87


Pulse Rate                       103


Respiratory Rate                 18


O2 Sat by Pulse Oximetry         96











- Recommendations


5) RRT Level of Care Recommendations: Remain in current setting





I.Reason for RRT





- A) Acute Change in Patient:


(Select all that apply): Staff member or family is worried about patient, Acute 

change in heart rate less than 50 or greater than 120





- Neurological Status


(Select all that apply): Aggressive





- Respiratory


Oxygen Delivery Method: Room Air





- Constitutional


Appears: Toxic, No Acute Distress, Combative, Agitated


Additional Comments: 





pt was attempting to get out of bed, being restrained by nurses, hospital 

security called and assisted in restraining pt








- Head


Head Exam: ATRAUMATIC, NORMAL INSPECTION, NORMOCEPHALIC





- Eyes


Eye Exam: EOMI, Normal appearance





- Respiratory Exam


Respiratory Exam: NORMAL BREATHING PATTERN.  absent: Accessory Muscle Use, 

Respiratory Distress





- Cardiovascular Exam


Cardiovascular Exam: Tachycardia, REGULAR RHYTHM, +S1, +S2





- GI/Abdominal Exam


GI & Abdominal Exam: Soft.  absent: Distended, Tenderness, Rebound





- Neurological Exam


Neurological Exam: Altered (confusd, pt thinks that he is home ), Awake





- Extremities Exam


Extremities Exam: Full ROM


Additional comments: 





pt was placed in 2-point UE restraints





Plan





- Assessment of Findings&Treatment Plan





RRT was called and team responded STAT. pt was found to be attempting to get 

out of bed and was being restrained by team of nurses. Tavon harris was called and 

hospital security responded. Dr. SIRI You and senior resident were present. 

Patient is admitted for ETOH withdrawal, and was aggressive about leaving the 

hospital and going home, but did not make any threats against nurses, MDs or 

hospital staff; pt was not combative and did not strike any persons. 





Plan: 


- 2-point UE restraints were applied


- Geodon 20mg IM stat was given 


- Librium 50mg PO stat was also ordered 


- Librium 50mg PO Q8H PRN added to standing orders


- Ativan 2mg changed to Q4H JOVANA and Q2H PRN


- vitals at end of RRT were stable and pt less agitated


- will re-evaluate pt later to see if less agitated to remove restraints





Patient evaluated and plan d/w attending, Dr. SIRI Avery PGY1


pager # [phone]





<Cuco You N - Last Filed: 09/07/17 19:27>





RRT Nurse Assessment





- Vital Signs


Vital Sign: 


Rapid Response Vital Sign





Blood Pressure                   138/86


Pulse Rate                       216


Respiratory Rate                 24


Temperature                      99.2 F


Oxygen Saturation                95











- Vital Signs at end of RRT


Vital Signs at end of RRT: 


Rapid Response End Vital Sign





Blood Pressure                   145/87


Pulse Rate                       103


Respiratory Rate                 18


O2 Sat by Pulse Oximetry         96











Addendum


Addendum: 





09/07/17 1:21


pt with DT is agitated and HR increased to 178 . pt is confused , states take 

me to police station.PT is on ativan 2mg q6h and 2mg q2 prn.has recieved ativan 

few minutes back.pt was given placed on [id number] mg q8h and stat dose of 

geodan and lubrium was given ,also changed the ativan to q4 .pt improved hr 

decreased to 96.

## 2017-09-07 NOTE — CP.PCM.PN
Subjective





- Date & Time of Evaluation


Date of Evaluation: 09/07/17


Time of Evaluation: 09:43





- Subjective


Subjective: 





This patient was seen and examined at bedside.  Per documentation, the patient 

had a rapid response called on him for increased agitation and trying to leave 

the hospital.  The patient was subsequently put in 2 point restraints and given 

Geodon and Librium. The patient was reporting some anxiety but denies any chest 

pain, shortness of breath, nausea, vomiting, lightheadedness, dizziness, 

changes in vision, syncopal episodes or any other complaints.





PMH: HTN, Alcohol abuse


PSH: denies


Allergies: NKDA


Medications: Reviewed


Social Hx: Drinks beer daily, Quit smoking, lives with a friend, ambulates by 

self, Able to perfrom ADL's without assistance, Unemployed.





 





Objective





- Vital Signs/Intake and Output


Vital Signs (last 24 hours): 


 











Temp Pulse Resp BP Pulse Ox


 


 99 F   109 H  20   129/92 H  97 


 


 09/07/17 12:00  09/07/17 12:00  09/07/17 12:00  09/07/17 12:00  09/07/17 06:00








Intake and Output: 


 











 09/07/17 09/07/17





 06:59 18:59


 


Intake Total 0 


 


Output Total 500 


 


Balance -500 














- Medications


Medications: 


 Current Medications





Chlordiazepoxide (Librium)  50 mg PO Q8 JOVANA


   PRN Reason: Protocol


   Last Admin: 09/07/17 05:12 Dose:  50 mg


Famotidine (Pepcid)  20 mg PO DAILY [facility]


   Last Admin: 09/07/17 09:35 Dose:  Not Given


Folic Acid (Folic Acid)  1 mg PO DAILY [facility]


   Last Admin: 09/07/17 09:35 Dose:  Not Given


Lorazepam (Ativan)  2 mg IVP Q2H PRN; Protocol


   PRN Reason: Symptoms of alcohol withdrawl


   Last Admin: 09/07/17 06:51 Dose:  2 mg


Lorazepam (Ativan)  2 mg IVP Q4H JOVANA


   PRN Reason: Protocol


   Last Admin: 09/07/17 09:29 Dose:  2 mg


Ondansetron HCl (Zofran Inj)  2 mg IVP Q4H PRN


   PRN Reason: Nausea/Vomiting


Potassium Chloride (Klor-Con 10)  10 meq PO ONCE ONE


   Stop: 09/08/17 08:29











- Labs


Labs: 


 





 09/07/17 02:20 





 09/07/17 02:20 











- Head Exam


Head Exam: ATRAUMATIC, NORMAL INSPECTION, NORMOCEPHALIC





- Eye Exam


Eye Exam: EOMI, Normal appearance, PERRL


Pupil Exam: NORMAL ACCOMODATION, PERRL.  absent: Irregular





- ENT Exam


ENT Exam: Mucous Membranes Moist, Normal Exam





- Neck Exam


Neck Exam: Full ROM, Normal Inspection





- Respiratory Exam


Respiratory Exam: Clear to Ausculation Bilateral, NORMAL BREATHING PATTERN.  

absent: Accessory Muscle Use, Chest Wall Tenderness





- Cardiovascular Exam


Cardiovascular Exam: REGULAR RHYTHM, +S1, +S2.  absent: Gallop, Rubs





- GI/Abdominal Exam


GI & Abdominal Exam: Soft, Normal Bowel Sounds.  absent: Rigid, Tenderness





- Extremities Exam


Extremities Exam: Full ROM, Normal Capillary Refill, Normal Inspection





- Back Exam


Back Exam: NORMAL INSPECTION.  absent: paraspinal tenderness





- Neurological Exam


Neurological Exam: Alert, Awake, CN II-XII Intact





- Psychiatric Exam


Psychiatric exam: Agitated, Anxious.  absent: Normal Affect, Normal Mood





- Skin


Skin Exam: Dry, Intact.  absent: Mottled, Vesicles





Assessment and Plan





- Assessment and Plan (Free Text)


Assessment: 





Alcohol withdrawal ass/w anxiety


-Patient a chronic alcoholic.


-Multiple admissions with alcohol.


-Last night patient had a Rapid Response called on him and was given Geodon and 

Librium and put in 2 point restraints.  Patient remains in restraints. Reasses 

patient in the morning.


-Ativan changed to Q4h JOVANA and CIWA score now 2(Original 7 upon admission).


-Continue Banana bag, folate.


-Discussed with patient if he was ready to quit drinking.  Patient is open to 

get help for his drinking problem.  Will discuss more with the patient tomorrow.


-Fall protocols and Seizure protocols.





UTI


-U/A showed (+)Leukocyte Esterase and blood. Urine Culture Pending.


-Rocephin completed. Patient denying in urinary symptoms currently. 





Nausea and vomiting 2/2 likely to gastroenteritis


-Patient received 1L NS banana bag and zofran in ED.


-Patient symptoms resolved.  Abdominal exam benign.


-Phosphorus(3.4) and Magnesium (1.7)


-Continue Zofran PRN.





Transaminitis


-most likely due to chronic alcohol abuse.


-AST:93( down from 95 on 9/6) and ALT:69(down from 75 on 9/6)





Hypertension


-continue home medications





GI ppx


-Continue Protonix





DVT ppx


-SCD's

## 2017-09-08 LAB
BASOPHILS # BLD AUTO: 0.03 K/MM3 (ref 0–2)
BASOPHILS NFR BLD: 0.4 % (ref 0–3)
BUN SERPL-MCNC: 13 MG/DL (ref 7–21)
CALCIUM SERPL-MCNC: 10.2 MG/DL (ref 8.4–10.5)
CHLORIDE SERPL-SCNC: 105 MMOL/L (ref 98–107)
CO2 SERPL-SCNC: 20 MMOL/L (ref 21–33)
EOSINOPHIL # BLD: 0.1 10*3/UL (ref 0–0.7)
EOSINOPHIL NFR BLD: 0.8 % (ref 1.5–5)
ERYTHROCYTE [DISTWIDTH] IN BLOOD BY AUTOMATED COUNT: 12.8 % (ref 11.5–14.5)
GLUCOSE SERPL-MCNC: 78 MG/DL (ref 70–110)
GRANULOCYTES # BLD: 5.5 10*3/UL (ref 1.4–6.5)
GRANULOCYTES NFR BLD: 69.2 % (ref 50–68)
HCT VFR BLD CALC: 43 % (ref 42–52)
LYMPHOCYTES # BLD: 1.4 10*3/UL (ref 1.2–3.4)
LYMPHOCYTES NFR BLD AUTO: 17.2 % (ref 22–35)
MAGNESIUM SERPL-MCNC: 2.1 MG/DL (ref 1.7–2.2)
MCH RBC QN AUTO: 35.9 PG (ref 25–35)
MCHC RBC AUTO-ENTMCNC: 36.3 G/DL (ref 31–37)
MCV RBC AUTO: 98.9 FL (ref 80–105)
MONOCYTES # BLD AUTO: 1 10*3/UL (ref 0.1–0.6)
MONOCYTES NFR BLD: 12.4 % (ref 1–6)
PLATELET # BLD: 169 10^3/UL (ref 120–450)
PMV BLD AUTO: 9.4 FL (ref 7–11)
POTASSIUM SERPL-SCNC: 3.2 MMOL/L (ref 3.6–5)
SODIUM SERPL-SCNC: 145 MMOL/L (ref 132–148)
WBC # BLD AUTO: 7.9 10^3/UL (ref 4.5–11)

## 2017-09-08 RX ADMIN — THERA TABS SCH: TAB at 09:06

## 2017-09-08 RX ADMIN — THERA TABS SCH TAB: TAB at 08:06

## 2017-09-08 RX ADMIN — CEFTRIAXONE SCH MLS/HR: 1 INJECTION, SOLUTION INTRAVENOUS at 10:30

## 2017-09-08 NOTE — CON
DATE:  09/08/2017



HISTORY OF PRESENT ILLNESS:  This is a 36-year-old gentleman with history

of alcohol abuse, who presented 2 days ago with some abdominal pain with

vomiting and diarrhea.  It appears that he had then a tremor, confusion and

disorientation.  Presumptive diagnosis of alcohol withdrawal was made and

the patient was treated with benzos and Geodon p.r.n. for extreme

agitation.  Two days ago, a rapid response was called because the patient

was out of control, confused, combative and extremely tachycardic and

hypertensive.  With extra dose of Geodon, he was managed to calm down. 

Yesterday, however, concern for severe delirium tremens was raised again

and the patient was transferred to ICU for the management and monitoring. 

He was started on Precedex.  No fever.  No chills.  No blurry vision.  No

chest pain.  No shortness of breath.  No cough.  No palpitations.  No

dizziness.  No dysuria.



PAST MEDICAL HISTORY:  Hypertension and alcohol abuse.



ALLERGIES:  NKDA.



SOCIAL HISTORY:  The patient is a known alcoholic.



FAMILY HISTORY:  Noncontributory.



HOME MEDICATIONS:  Hydrochlorothiazide, thiamine, multivitamins, and

lisinopril.



REVIEW OF SYSTEMS:  A review of 12-organ systems other than mentioned in

the history of present illness is negative.



PHYSICAL EXAMINATION:

VITAL SIGNS:  Heart rate , blood pressure 104/60, oxygen saturation

96% on room air and respiratory rate 19.

HEAD AND NECK:  Head and neck atraumatic.

LUNGS:  Clear to auscultation bilaterally.

HEART:  Regular rate and rhythm.  S1 and S2 normal.

ABDOMEN:  Soft, nontender, and nondistended.

MUSCULOSKELETAL:  No C/C/E.

NEUROLOGIC:  The patient moves all extremities spontaneously.

SKIN:  Color is moist.

PSYCHIATRIC:  The patient follows commands and open eyes to command.



LABORATORY DATA:  WBC of 5.2, hemoglobin 15.9, platelet count 114.  Sodium

135, potassium 3.2, chloride 105, carbon dioxide 20, BUN 13, creatinine

0.8, glucose 78.



MEDICATIONS:  Librium 50 mg p.o. q. 8 hours, Precedex (which was just

held), folic acid, Ativan 2 mg IV q. 2 hours p.r.n., Zofran p.r.n.,

ceftriaxone, normal saline 100 mL/hour and thiamine.



ASSESSMENT AND PLAN:  This is a 36-year-old gentleman with delirium

tremens, on Librium standard dose and Ativan p.r.n.  At the present time,

we will see if the patient be okay off of Precedex.  If extreme agitation

presenting again, we will restart Precedex.  I would be more careful with

antipsychotics which can lower threshold for seizures.  Meanwhile, we will

continue with some non-medicamentose ways to treat his delirium.  We will try to

optimize his circadian  and sleep patterns.  We will try to minimize 
stimulation during

the night and evening hours.  We will continue to target euvolemia,

glycemia, normothermia and oxygen saturation more than 90%.  We will

continue with deep venous thrombosis and gastrointestinal prophylaxes.  We

will continue with one-to-one.  We will continue with psychiatry followup.



Addendum: patient substantially improved, Precedex stopped, pt is eating, alert 
and oriented x 3



ccm time 40 min







__________________________________________

Ziggy Juárez MD





DD:  09/08/2017 8:03:18

DT:  09/08/2017 8:52:30

Job # [id number]

SOLE

## 2017-09-08 NOTE — CP.CCUPN
CCU Subjective





- Physician Review


Events Since Last Encounter (Free Text): 





09/08/17 07:23


Transferred from floor to ICU 2/2  DTs/agitation requiring multiple doses of 

sedatives


Critical Care Time Spent (in minutes): 40





CCU Objective





- Vital Signs / Intake & Output


Intake and Output (Last 8hrs): 


 Intake & Output











 09/07/17 09/08/17 09/08/17





 22:59 06:59 14:59


 


Intake Total 600  


 


Output Total 1000  


 


Balance -400  


 


Weight  62.051 kg 


 


Intake:   


 


  Oral 600  


 


Output:   


 


  Urine 1000  


 


    Urine, Voided 1000  














- Physical Exam


Head: Positive for: Atraumatic, Normocephalic


Pupils: Positive for: PERRL


Extroacular Muscles: Positive for: EOMI


Conjunctiva: Positive for: Icteric


Mouth: Positive for: Moist Mucous Membranes


Nose (External): Positive for: Atraumatic.  Negative for: Abrasion


Neck: Positive for: Normal Range of Motion.  Negative for: MIDLINE TENDERNESS, 

JVD


Respiratory/Chest: Positive for: Clear to Auscultation.  Negative for: 

Respiratory Distress, Accessory Muscle Use


Cardiovascular: Positive for: Regular Rate and Rhythm, Normal S1, S2


Abdomen: Positive for: Tenderness (diffuse), Guarding.  Negative for: Distention

, Peritoneal Signs, McBurney's Point Tender, Rovsing's Sign Present


Back: Negative for: CVA Tenderness


Upper Extremity: Positive for: Normal Inspection, NORMAL PULSES, Other (Tremors 

with hands extended. ).  Negative for: Edema


Lower Extremity: Positive for: Normal Inspection, NORMAL PULSES.  Negative for: 

Edema, CALF TENDERNESS


Neurological: Positive for: CN II-XII Intact, Speech Normal, Motor Func Grossly 

Intact (5/5 strength UE/LE b/l), Normal Cerebellar Funct (normal finger to nose

, heel to shin)


Skin: Positive for: Warm, Dry, Normal Color


Psychiatric: Positive for: Alert, Oriented x 3, Normal Insight, Normal 

Concentration, Depressed Mood ("I drink because I'm depressed"), Intoxicated (

Reports drinking 5 beers today)





- Medications


Active Medications: 


Active Medications











Generic Name Dose Route Start Last Admin





  Trade Name Freq  PRN Reason Stop Dose Admin


 


Chlordiazepoxide  50 mg  09/07/17 06:00  09/08/17 06:58





  Librium  PO   Not Given





  Q8 [facility]   





  Protocol   


 


Famotidine  20 mg  09/06/17 10:00  09/07/17 09:35





  Pepcid  PO   Not Given





  DAILY JOVANA   


 


Folic Acid  1 mg  09/06/17 10:00  09/07/17 09:35





  Folic Acid  PO   Not Given





  DAILY JOVANA   


 


Sodium Chloride  1,000 mls @ 100 mls/hr  09/08/17 04:15  09/08/17 04:44





  Sodium Chloride 0.9%  IV   100 mls/hr





  .Q10H JOVANA   Administration


 


Dexmedetomidine HCl  400 mcg in 100 mls @ 3.103 mls/hr  09/08/17 05:33  09/08/ 17 05:35





  Precedex 4 Mcg/Ml (100 Ml)  IV   0.2 mcg/kg/hr





  .Q24H PRN   3.103 mls/hr





  Agitation   Administration





  Protocol   





  0.2 MCG/KG/HR   


 


Potassium Chloride  10 meq in 100 mls @ 100 mls/hr  09/08/17 06:30  09/08/17 06:

59





  Potassium Chloride 10 Meq/100 Ml  IVPB  09/08/17 09:29  100 mls/hr





  Q2H JOVANA   Administration


 


Ceftriaxone Sodium  1 gm in 100 mls @ 100 mls/hr  09/08/17 10:00  





  Rocephin 1 Gram Ivpb  IVPB   





  DAILY JOVANA   





  Protocol   


 


Sodium Chloride  1,000 mls @ 999 mls/hr  09/08/17 07:00  





  Sodium Chloride 0.9%  IV  09/08/17 08:00  





  .Q1H1M STA   


 


Lorazepam  2 mg  09/06/17 14:00  09/08/17 01:40





  Ativan  IVP   2 mg





  Q2H PRN   Administration





  Symptoms of alcohol withdrawl   





  Protocol   


 


Multivitamins  1 tab  09/08/17 10:00  





  Thera Tab  PO   





  1000 JOVANA   


 


Ondansetron HCl  2 mg  09/06/17 00:17  





  Zofran Inj  IVP   





  Q4H PRN   





  Nausea/Vomiting   


 


Potassium Chloride  10 meq  09/08/17 08:28  





  Klor-Con 10  PO  09/08/17 08:29  





  ONCE ONE   


 


Thiamine HCl  100 mg  09/08/17 10:00  





  Vitamin B1 Tab  PO   





  DAILY JOVANA   














- Patient Studies


Lab Studies: 


 Lab Studies











  09/08/17 Range/Units





  06:10 


 


Sodium  145  (132-148)  mmol/L


 


Potassium  3.2 L  (3.6-5.0)  mmol/L


 


Chloride  105  ()  mmol/L


 


Carbon Dioxide  20 L  (21-33)  mmol/L


 


Anion Gap  23 H  (10-20)  


 


BUN  13  (7-21)  mg/dL


 


Creatinine  0.8  (0.5-1.4)  mg/dL


 


Est GFR (African Amer)  > 60  


 


Est GFR (Non-Af Amer)  > 60  


 


Random Glucose  78  ()  mg/dL


 


Calcium  10.2  (8.4-10.5)  mg/dL


 


Magnesium  2.1  (1.7-2.2)  mg/dL








 Laboratory Results - last 24 hr











  09/08/17





  06:10


 


Sodium  145


 


Potassium  3.2 L


 


Chloride  105


 


Carbon Dioxide  20 L


 


Anion Gap  23 H


 


BUN  13


 


Creatinine  0.8


 


Est GFR ( Amer)  > 60


 


Est GFR (Non-Af Amer)  > 60


 


Random Glucose  78


 


Calcium  10.2


 


Magnesium  2.1











EKG/Cardiology Studies: 


Cardiology / EKG Studies





09/08/17 05:39


EKG [ELECTROCARDIOGRAM] Stat 


   Comment: 


   Reason For Exam: tachycardia earlier, HR >180














Critical Care Progress Note





- Nutrition


Nutrition: 


 Nutrition











 Category Date Time Status


 


 Regular Diet [DIET] Diets  09/07/17 Dinner Ordered

## 2017-09-08 NOTE — CARD
--------------- APPROVED REPORT --------------





EKG Measurement

Heart [id number]

NJ [id number]

[id number]

[id number]

[id number]



<Conclusion>

Normal sinus rhythm

Normal ECG

## 2017-09-08 NOTE — CP.PCM.PN
Subjective





- Date & Time of Evaluation


Date of Evaluation: 09/08/17


Time of Evaluation: 07:28





- Subjective


Subjective: 





Patient was seen and examined at bedside. Patient reports wanting to go home.  

Patient was transferred to ICU overnight due to increased agitation and 

elevated heart rate ( hr read @200). Was unable to obtain a further ROS due to 

his sedation. 





Objective





- Vital Signs/Intake and Output


Vital Signs (last 24 hours): 


 











Temp Pulse Resp BP Pulse Ox


 


 98.7 F   95 H  16   142/60   100 


 


 09/08/17 08:14  09/08/17 12:14  09/08/17 08:14  09/08/17 08:14  09/08/17 08:14








Intake and Output: 


 











 09/08/17 09/08/17





 06:59 18:59


 


Intake Total  350


 


Balance  350














- Medications


Medications: 


 Current Medications





Chlordiazepoxide (Librium)  50 mg PO Q8 [facility]


   PRN Reason: Protocol


   Last Admin: 09/08/17 08:06 Dose:  50 mg


Famotidine (Pepcid)  20 mg PO DAILY [facility]


   Last Admin: 09/08/17 09:06 Dose:  Not Given


Folic Acid (Folic Acid)  1 mg PO DAILY [facility]


   Last Admin: 09/08/17 09:07 Dose:  Not Given


Dexmedetomidine HCl (Precedex 4 Mcg/Ml (100 Ml))  400 mcg in 100 mls @ 3.103 mls

/hr IV .Q24H PRN; Protocol; 0.2 MCG/KG/HR


   PRN Reason: Agitation


   Last Admin: 09/08/17 05:35 Dose:  0.2 mcg/kg/hr, 3.103 mls/hr


Ceftriaxone Sodium (Rocephin 1 Gram Ivpb)  1 gm in 100 mls @ 100 mls/hr IVPB 

DAILY [facility]


   PRN Reason: Protocol


   Last Admin: 09/08/17 10:30 Dose:  100 mls/hr


Multivitamins/Vitamin C 10 ml/Thiamine HCl 100 mg/ Folic Acid 1 mg/ Sodium 

Chloride  1,011.2 mls @ 125 mls/hr IV .Q8H6M ONE


   Stop: 09/08/17 20:40


   Last Admin: 09/08/17 13:24 Dose:  125 mls/hr


Lorazepam (Ativan)  2 mg IVP Q2H PRN; Protocol


   PRN Reason: Symptoms of alcohol withdrawl


   Last Admin: 09/08/17 13:16 Dose:  2 mg


Multivitamins (Thera Tab)  1 tab PO 1000 [facility]


   Last Admin: 09/08/17 09:06 Dose:  Not Given


Ondansetron HCl (Zofran Inj)  2 mg IVP Q4H PRN


   PRN Reason: Nausea/Vomiting


Thiamine HCl (Vitamin B1 Tab)  100 mg PO DAILY [facility]


   Last Admin: 09/08/17 09:07 Dose:  Not Given











- Labs


Labs: 


 





 09/08/17 07:00 





 09/08/17 06:10 











- Head Exam


Head Exam: ATRAUMATIC, NORMAL INSPECTION, NORMOCEPHALIC





- Eye Exam


Eye Exam: EOMI, Normal appearance, PERRL


Pupil Exam: NORMAL ACCOMODATION, PERRL.  absent: Irregular





- ENT Exam


ENT Exam: Mucous Membranes Moist, Normal Exam





- Neck Exam


Neck Exam: Full ROM, Normal Inspection





- Respiratory Exam


Respiratory Exam: Clear to Ausculation Bilateral, NORMAL BREATHING PATTERN.  

absent: Accessory Muscle Use, Chest Wall Tenderness, Respiratory Distress





- Cardiovascular Exam


Cardiovascular Exam: REGULAR RHYTHM, RRR, +S1, +S2.  absent: Rubs





- GI/Abdominal Exam


GI & Abdominal Exam: Soft, Normal Bowel Sounds.  absent: Rigid, Tenderness





- Extremities Exam


Extremities Exam: Full ROM, Normal Capillary Refill.  absent: Joint Swelling





- Back Exam


Back Exam: NORMAL INSPECTION.  absent: paraspinal tenderness





- Neurological Exam


Neurological Exam: absent: Awake, CN II-XII Intact





- Psychiatric Exam


Psychiatric exam: Normal Affect, Normal Mood





- Skin


Skin Exam: Dry, Intact





Assessment and Plan





- Assessment and Plan (Free Text)


Assessment: 





35yo M PMH of ETOH abuse and withdrawals transferred to ICU for unstable vitals 

(HR >180) and agitation, worrisome for DT's 2/2 ETOH withdrawal. Patient was 

given Geodon 20mg IM x1 and Ativan 2mg IV x1 tonight for agitation. Pt 

currently asleep and HR is mild tachy in low 100's. EKG done in ICU upon 

transfer showed NSR with HR 98. Mild Hypokalemia noted. Urine culture indicates 

G-rods UTI. 





Plan: 








Neuro: 


- started Precedex 0.2mcg/kg/hr to control agitation


-Started on Banana bag w/ NS @125cc/hr x3 bags.


- Neuro checks q4H


- seizure precautions 


- Limbrium 50mg Q8H JOVANA for withdrawal


- Ativan 2mg Q4H JOVANA and 2mg Q2H PRN for withdrawal symptoms


- Restrains PRN for safety





Pulm:


-97% on RA 


- patient is O2Sat is adequate on 1LNC


- requires no resp intervention





CV:


- Patient remains sinus tachycardic a 105. Continue to monitor for worsening of 

vital signs.





GI:


- Pepcid daily for GI ppx 


- Zofran PRN n/v





Renal:


-K 3.2


- K-rider for hypokalemia 


-Reasses K in the morning with CMP.








ID:


- start Rocephin 1gm daily for UTI


- gonorrhea/chlamydia pending.





Heme:


- H/H currently stable, cont to trend


- SCDs for DVT ppx

## 2017-09-08 NOTE — CP.PCM.CON
<DELFINOJACKELYN BROCK - Last Filed: 17 05:41>





History of Present Illness





- History of Present Illness


History of Present Illness: 





Jackelyn Avery PGY1 ICU Consult Note for Dr. Harris





cc: tachycardia and agitation





35M with PMH alcoholism currently withdrawing, HTN transferred to ICU for 

agitation, tachycardia and withdrawals, worrisome for DTs. Yesterday, pt was 

agitated and was attempting to get out of bed and HR was noted to be >200. 

Patient was restrained and meds adjusted for withdrawal. Today, patient was 

noted to be agitated again and attempting to get out of bed and was combative. 

HR was again noted to be elevated >180. Pt currently asleep and was current 

subjective history could not be obtained. 





[place] [facility] [place].


PMD:  Dr. Vanessa


PMH:  hypertension, alcohol abuse


PSH:  denies 


All: NKA


Home Medications:  reviewed


Social History:  lives with friend; ambulates by self, able to do his ADLs, 

quit smoking 8 months ago; drinks 10 beers daily,denies illicit drug use; 

unemployed





Review of Systems





- Review of Systems


Systems not reviewed;Unavailable: Other (pt currently asleep after being 

agitated and after Geodon and Ativan given; hx could not be obtained)





Past Patient History





- Infectious Disease


Hx of Infectious Diseases: None





- Tetanus Immunizations


Tetanus Immunization: Unknown





- Past Social History


Smoking Status: Former Smoker


Alcohol: > 2 Drinks/Day


Drugs: Denies





- CARDIAC


Hx Cardiac Disorders: Yes (Palpitations)


Hx Hypertension: Yes





- PULMONARY


Hx Respiratory Disorders: Yes (pharygitis)


Other/Comment: PHARYNGITIS





- NEUROLOGICAL


Hx Neurological Disorder: No





- HEENT


Hx HEENT Problems: No





- RENAL


Hx Chronic Kidney Disease: No





- ENDOCRINE/METABOLIC


Hx Endocrine Disorders: No





- HEMATOLOGICAL/ONCOLOGICAL


Hx Blood Disorders: No





- INTEGUMENTARY


Hx Dermatological Problems: No





- MUSCULOSKELETAL/RHEUMATOLOGICAL


Hx Falls: Yes


Hx Unsteady Gait: Yes





- GASTROINTESTINAL


Hx Gastrointestinal Disorders: No





- GENITOURINARY/GYNECOLOGICAL


Hx Genitourinary Disorders: No





- PSYCHIATRIC


Hx Anxiety: Yes


Hx Depression: Yes


Hx Hallucinations: Yes


Hx Substance Use: Yes (ETOH)


Other/Comment: ETOH ABUSE 10 BEERS A DAY





- SURGICAL HISTORY


Hx Surgeries: No





- ANESTHESIA


Hx Anesthesia: No


Hx Anesthesia Reactions: No


Hx Malignant Hyperthermia: No





Meds


Allergies/Adverse Reactions: 


 Allergies











Allergy/AdvReac Type Severity Reaction Status Date / Time


 


No Known Allergies Allergy   Verified 17 19:35














- Medications


Medications: 


 Current Medications





Chlordiazepoxide (Librium)  50 mg PO Q8 [facility]


   PRN Reason: Protocol


   Last Admin: 17 21:41 Dose:  50 mg


Famotidine (Pepcid)  20 mg PO DAILY [facility]


   Last Admin: 17 09:35 Dose:  Not Given


Folic Acid (Folic Acid)  1 mg PO DAILY [facility]


   Last Admin: 17 09:35 Dose:  Not Given


Dexmedetomidine HCl (Precedex 4 Mcg/Ml (100 Ml))  400 mcg in 100 mls @ 3.493 mls

/hr IV .Q24H PRN; Protocol; 0.2 MCG/KG/HR


   PRN Reason: Agitation


Sodium Chloride (Sodium Chloride 0.9%)  1,000 mls @ 100 mls/hr IV .Q10H [facility]


   Last Admin: 17 04:44 Dose:  100 mls/hr


Lorazepam (Ativan)  2 mg IVP Q2H PRN; Protocol


   PRN Reason: Symptoms of alcohol withdrawl


   Last Admin: 17 01:40 Dose:  2 mg


Lorazepam (Ativan)  2 mg IVP Q4H [facility]


   PRN Reason: Protocol


   Last Admin: 17 04:11 Dose:  Not Given


Ondansetron HCl (Zofran Inj)  2 mg IVP Q4H PRN


   PRN Reason: Nausea/Vomiting


Potassium Chloride (Klor-Con 10)  10 meq PO ONCE ONE


   Stop: 17 08:29











Physical Exam





- Constitutional


Appears: Well, No Acute Distress, Other (currently asleep, attempt was not made 

to awake pt due to his prior agitated state)





- Head Exam


Head Exam: ATRAUMATIC, NORMAL INSPECTION, NORMOCEPHALIC





- Eye Exam


Additional comments: 





did not assess





- ENT Exam


ENT Exam: Mucous Membranes Moist, Normal Exam





- Neck Exam


Neck exam: Positive for: Normal Inspection





- Respiratory Exam


Respiratory Exam: Clear to Auscultation Bilateral, NORMAL BREATHING PATTERN.  

absent: Accessory Muscle Use, Respiratory Distress





- Cardiovascular Exam


Cardiovascular Exam: Tachycardia, +S1, +S2.  absent: Gallop, JVD, Rubs





- GI/Abdominal Exam


GI & Abdominal Exam: Normal Bowel Sounds, Soft.  absent: Distended





- Extremities Exam


Extremities exam: Positive for: normal inspection.  Negative for: pedal edema





- Back Exam


Back exam: NORMAL INSPECTION





- Neurological Exam


Neurological exam: Altered





- Psychiatric Exam


Additional comments: 





did not assess








- Skin


Skin Exam: Normal Color, Warm





- Additional Findings


Additional findings: 





pt in posey vest 


no wrist restraints 





Results





- Vital Signs


Recent Vital Signs: 


 Last Vital Signs











Temp  99.1 F   17 17:48


 


Pulse  141 H  17 18:00


 


Resp  20   17 17:48


 


BP  130/95 H  17 17:48


 


Pulse Ox  97   17 06:00














- Labs


Result Diagrams: 


 17 02:20





 17 02:20





Assessment & Plan





- Assessment and Plan (Free Text)


Assessment: 





37yo M PMH of ETOH abuse and withdrawals transferred to ICU for unstable vitals 

(HR >180) and agitation, worrisome for DT's 2/2 ETOH withdrawal. Patient was 

given Geodon 20mg IM x1 and Ativan 2mg IV x1 tonight for agitation. Pt 

currently asleep and HR is mild tachy in low 100's. EKG done in ICU upon 

transfer showed NSR with HR 98. Mild Hypokalemia noted. Urine culture indicates 

G-rods UTI. 





Neuro: 


- started Precedex 0.2mcg/kg/hr to control agitation


- Neuro checks q4H


- seizure precautions 


- Limbrium 50mg Q8H [facility]OVANA for withdrawal


- Ativan 2mg Q4H JOVANA and 2mg Q2H PRN for withdrawal symptoms


- Restrains PRN for safety





Pulm: 


- patient is O2Sat is adequate on 1LNC


- requires no resp intervention





CV:


- no specific interventions


- HR should be controlled with control of withdrawal symptoms





GI:


- Pepcid daily for GI ppx 


- Zofran PRN n/v





Renal:


- K-rider for hypokalemia 





Endo:


- requires no endo intervention





ID:


- start Rocephin 1gm daily for UTI


- test for gonorrhea/chlamydia





Heme:


- H/H currently stable, cont to trend


- SCDs for DVT ppx





FENA: 


- NS @ 100, regular diet


-  peripheral IV x1 R wrist





Patient was seen, discussed and reviewed with attending, Dr. Steven Avery PGY1 








- Date & Time


Date: 17


Time: 06:01





<Fidel Harris Q - Last Filed: 17 06:52>





Meds





- Medications


Medications: 


 Current Medications





Chlordiazepoxide (Librium)  50 mg PO Q8 [facility]


   PRN Reason: Protocol


   Last Admin: 17 21:41 Dose:  50 mg


Famotidine (Pepcid)  20 mg PO DAILY [facility]


   Last Admin: 17 09:35 Dose:  Not Given


Folic Acid (Folic Acid)  1 mg PO DAILY [facility]


   Last Admin: 17 09:35 Dose:  Not Given


Sodium Chloride (Sodium Chloride 0.9%)  1,000 mls @ 100 mls/hr IV .Q10H [facility]


   Last Admin: 17 04:44 Dose:  100 mls/hr


Dexmedetomidine HCl (Precedex 4 Mcg/Ml (100 Ml))  400 mcg in 100 mls @ 3.103 mls

/hr IV .Q24H PRN; Protocol; 0.2 MCG/KG/HR


   PRN Reason: Agitation


   Last Admin: 17 05:35 Dose:  0.2 mcg/kg/hr, 3.103 mls/hr


Potassium Chloride (Potassium Chloride 10 Meq/100 Ml)  10 meq in 100 mls @ 100 

mls/hr IVPB Q2H [facility]


   Stop: 17 09:29


Ceftriaxone Sodium (Rocephin 1 Gram Ivpb)  1 gm in 100 mls @ 100 mls/hr IVPB 

DAILY [facility]


   PRN Reason: Protocol


Lorazepam (Ativan)  2 mg IVP Q2H PRN; Protocol


   PRN Reason: Symptoms of alcohol withdrawl


   Last Admin: 17 01:40 Dose:  2 mg


Lorazepam (Ativan)  2 mg IVP Q4H [facility]


   PRN Reason: Protocol


   Last Admin: 17 04:11 Dose:  Not Given


Ondansetron HCl (Zofran Inj)  2 mg IVP Q4H PRN


   PRN Reason: Nausea/Vomiting


Potassium Chloride (Klor-Con 10)  10 meq PO ONCE ONE


   Stop: 17 08:29











Results





- Vital Signs


Recent Vital Signs: 


 Last Vital Signs











Temp  99.1 F   17 17:48


 


Pulse  141 H  17 18:00


 


Resp  20   17 17:48


 


BP  130/95 H  17 17:48


 


Pulse Ox  97   17 06:00














- Labs


Result Diagrams: 


 17 02:20





 17 02:20





Attending/Attestation





- Attestation


I have personally seen and examined this patient.: Yes


I have fully participated in the care of the patient.: Yes


I have reviewed all pertinent clinical information: Yes


Notes (Text): 





17 06:50


I agree with the above note and exam by the resident with the addition of the 

followin y/o  male with frequent admissions for alcohol withdrawal was having 

severe symptoms of alcohol withdrawal overnight requiring increased medication.

  Patient was a rapid response yesterday for unstable vital signs due to 

agitation and alcohol withdrawal.  He underwent a similar episode tonight 

however was treated on the floor with an additional dose of ativan as well as 

Geodon IM.  Patient was transferred to the ICU and placed on a precedex drip 

for continued sedation and monitoring.

## 2017-09-09 LAB
BUN SERPL-MCNC: 7 MG/DL (ref 7–21)
CALCIUM SERPL-MCNC: 9.1 MG/DL (ref 8.4–10.5)
CHLORIDE SERPL-SCNC: 110 MMOL/L (ref 98–107)
CO2 SERPL-SCNC: 22 MMOL/L (ref 21–33)
GLUCOSE SERPL-MCNC: 98 MG/DL (ref 70–110)
POTASSIUM SERPL-SCNC: 3.7 MMOL/L (ref 3.6–5)
SODIUM SERPL-SCNC: 144 MMOL/L (ref 132–148)

## 2017-09-09 RX ADMIN — CEFTRIAXONE SCH MLS/HR: 1 INJECTION, SOLUTION INTRAVENOUS at 10:21

## 2017-09-09 RX ADMIN — ASCORBIC ACID, VITAMIN A PALMITATE, CHOLECALCIFEROL, THIAMINE HYDROCHLORIDE, RIBOFLAVIN-5 PHOSPHATE SODIUM, PYRIDOXINE HYDROCHLORIDE, NIACINAMIDE, DEXPANTHENOL, ALPHA-TOCOPHEROL ACETATE, VITAMIN K1, FOLIC ACID, BIOTIN, CYANOCOBALAMIN SCH MLS/HR: 200; 3300; 200; 6; 3.6; 6; 40; 15; 10; 150; 600; 60; 5 INJECTION, SOLUTION INTRAVENOUS at 01:10

## 2017-09-09 RX ADMIN — ASCORBIC ACID, VITAMIN A PALMITATE, CHOLECALCIFEROL, THIAMINE HYDROCHLORIDE, RIBOFLAVIN-5 PHOSPHATE SODIUM, PYRIDOXINE HYDROCHLORIDE, NIACINAMIDE, DEXPANTHENOL, ALPHA-TOCOPHEROL ACETATE, VITAMIN K1, FOLIC ACID, BIOTIN, CYANOCOBALAMIN SCH MLS/HR: 200; 3300; 200; 6; 3.6; 6; 40; 15; 10; 150; 600; 60; 5 INJECTION, SOLUTION INTRAVENOUS at 08:54

## 2017-09-10 LAB
ALBUMIN/GLOB SERPL: 1.4 {RATIO} (ref 1.1–1.8)
ALP SERPL-CCNC: 68 U/L (ref 38–126)
ALT SERPL-CCNC: 96 U/L (ref 7–56)
AMORPH SED URNS QL MICRO: (no result)
APPEARANCE UR: CLEAR
AST SERPL-CCNC: 104 U/L (ref 17–59)
BACTERIA #/AREA URNS HPF: (no result) /[HPF]
BASOPHILS # BLD AUTO: 0.03 K/MM3 (ref 0–2)
BASOPHILS NFR BLD: 0.7 % (ref 0–3)
BILIRUB SERPL-MCNC: 1.2 MG/DL (ref 0.2–1.3)
BILIRUB UR-MCNC: NEGATIVE MG/DL
BUN SERPL-MCNC: 3 MG/DL (ref 7–21)
CALCIUM SERPL-MCNC: 9.9 MG/DL (ref 8.4–10.5)
CHLORIDE SERPL-SCNC: 109 MMOL/L (ref 98–107)
CO2 SERPL-SCNC: 23 MMOL/L (ref 21–33)
COLOR UR: YELLOW
EOSINOPHIL # BLD: 0.2 10*3/UL (ref 0–0.7)
EOSINOPHIL NFR BLD: 4.9 % (ref 1.5–5)
EPI CELLS #/AREA URNS HPF: (no result) /HPF (ref 0–5)
ERYTHROCYTE [DISTWIDTH] IN BLOOD BY AUTOMATED COUNT: 12.8 % (ref 11.5–14.5)
GLOBULIN SER-MCNC: 2.9 GM/DL
GLUCOSE SERPL-MCNC: 100 MG/DL (ref 70–110)
GLUCOSE UR STRIP-MCNC: NEGATIVE MG/DL
GRANULOCYTES # BLD: 2.1 10*3/UL (ref 1.4–6.5)
GRANULOCYTES NFR BLD: 51.9 % (ref 50–68)
HCT VFR BLD CALC: 40.7 % (ref 42–52)
KETONES UR STRIP-MCNC: NEGATIVE MG/DL
LEUKOCYTE ESTERASE UR-ACNC: (no result) LEU/UL
LYMPHOCYTES # BLD: 1 10*3/UL (ref 1.2–3.4)
LYMPHOCYTES NFR BLD AUTO: 24 % (ref 22–35)
MCH RBC QN AUTO: 35 PG (ref 25–35)
MCHC RBC AUTO-ENTMCNC: 34.9 G/DL (ref 31–37)
MCV RBC AUTO: 100.2 FL (ref 80–105)
MONOCYTES # BLD AUTO: 0.8 10*3/UL (ref 0.1–0.6)
MONOCYTES NFR BLD: 18.5 % (ref 1–6)
PH UR STRIP: 6 [PH] (ref 4.7–8)
PLATELET # BLD: 173 10^3/UL (ref 120–450)
PMV BLD AUTO: 9.1 FL (ref 7–11)
POTASSIUM SERPL-SCNC: 3.5 MMOL/L (ref 3.6–5)
PROT SERPL-MCNC: 7 G/DL (ref 5.8–8.3)
PROT UR STRIP-MCNC: NEGATIVE MG/DL
RBC # UR STRIP: NEGATIVE /UL
RBC #/AREA URNS HPF: (no result) /HPF (ref 0–2)
SODIUM SERPL-SCNC: 144 MMOL/L (ref 132–148)
SP GR UR STRIP: 1.01 (ref 1–1.03)
UROBILINOGEN UR STRIP-ACNC: 1 E.U./DL
WBC # BLD AUTO: 4.1 10^3/UL (ref 4.5–11)

## 2017-09-10 RX ADMIN — THERA TABS SCH TAB: TAB at 10:13

## 2017-09-10 RX ADMIN — CEFTRIAXONE SCH MLS/HR: 1 INJECTION, SOLUTION INTRAVENOUS at 10:13

## 2017-09-10 NOTE — CP.PCM.PN
<Kobe Allison - Last Filed: 09/10/17 14:37>





Subjective





- Date & Time of Evaluation


Date of Evaluation: 09/10/17


Time of Evaluation: 09:19





- Subjective


Subjective: 





Patient was seen and examined at bedside.  The patient reports feeling better 

with no complaints. The patient seems closer back to his baseline during the 

visit.  The patient denies any chest pain, shortness of breath, abdominal pain, 

fevers, chills, nausea, vomiting , headaches, or any other complaints.





Objective





- Vital Signs/Intake and Output


Vital Signs (last 24 hours): 


 











Temp Pulse Resp BP Pulse Ox


 


 98.9 F   88   22   117/79   98 


 


 09/10/17 07:59  09/10/17 10:23  09/10/17 07:59  09/10/17 10:23  09/10/17 07:59








Intake and Output: 


 











 09/10/17 09/10/17





 06:59 18:59


 


Intake Total 1120 


 


Output Total 3100 


 


Balance -1980 














- Medications


Medications: 


 Current Medications





Chlordiazepoxide (Librium)  25 mg PO Q8 [facility]


   PRN Reason: Protocol


   Last Admin: 09/10/17 13:33 Dose:  25 mg


Famotidine (Pepcid)  20 mg PO DAILY [facility]


   Last Admin: 09/10/17 10:12 Dose:  20 mg


Folic Acid (Folic Acid)  1 mg PO DAILY [facility]


   Last Admin: 09/10/17 10:13 Dose:  1 mg


Dexmedetomidine HCl (Precedex 4 Mcg/Ml (100 Ml))  400 mcg in 100 mls @ 3.103 mls

/hr IV .Q24H PRN; Protocol; 0.2 MCG/KG/HR


   PRN Reason: Agitation


   Last Admin: 09/08/17 05:35 Dose:  0.2 mcg/kg/hr, 3.103 mls/hr


Ceftriaxone Sodium (Rocephin 1 Gram Ivpb)  1 gm in 100 mls @ 100 mls/hr IVPB 

DAILY [facility]


   PRN Reason: Protocol


   Last Admin: 09/10/17 10:13 Dose:  100 mls/hr


Lisinopril (Zestril)  20 mg PO DAILY [facility]


   Last Admin: 09/10/17 10:23 Dose:  Not Given


Lorazepam (Ativan)  2 mg IVP Q2H PRN; Protocol


   PRN Reason: Symptoms of alcohol withdrawl


   Last Admin: 09/10/17 06:33 Dose:  2 mg


Multivitamins (Thera Tab)  1 tab PO 1000 [facility]


   Last Admin: 09/10/17 10:13 Dose:  1 tab


Ondansetron HCl (Zofran Inj)  2 mg IVP Q4H PRN


   PRN Reason: Nausea/Vomiting


Potassium Chloride (K-Dur 20 Meq Er Tab)  40 meq PO ONCE ONE


   Stop: 09/10/17 14:19


Thiamine HCl (Vitamin B1 Tab)  100 mg PO DAILY [facility]


   Last Admin: 09/10/17 10:13 Dose:  100 mg











- Labs


Labs: 


 





 09/10/17 11:10 





 09/10/17 11:00 











- Head Exam


Head Exam: ATRAUMATIC, NORMAL INSPECTION, NORMOCEPHALIC





- Eye Exam


Eye Exam: EOMI, Normal appearance, PERRL.  absent: Nystagmus


Pupil Exam: NORMAL ACCOMODATION, PERRL.  absent: Miosis





- ENT Exam


ENT Exam: Mucous Membranes Moist





- Neck Exam


Neck Exam: Full ROM, Normal Inspection





- Respiratory Exam


Respiratory Exam: Clear to Ausculation Bilateral, NORMAL BREATHING PATTERN.  

absent: Accessory Muscle Use, Chest Wall Tenderness





- Cardiovascular Exam


Cardiovascular Exam: REGULAR RHYTHM, +S1, +S2





- GI/Abdominal Exam


GI & Abdominal Exam: Soft, Normal Bowel Sounds.  absent: Rigid, Tenderness





- Extremities Exam


Extremities Exam: Full ROM.  absent: Tenderness





- Back Exam


Back Exam: NORMAL INSPECTION.  absent: paraspinal tenderness





- Neurological Exam


Neurological Exam: Alert, Awake, CN II-XII Intact, Oriented x3





- Psychiatric Exam


Psychiatric exam: Normal Affect, Normal Mood





- Skin


Skin Exam: Dry, Intact, Normal Color





Assessment and Plan





- Assessment and Plan (Free Text)


Assessment: 





35yo M PMH of ETOH abuse and withdrawals transferred to ICU for unstable vitals 

(HR >180) and agitation, worrisome for DT's 2/2 ETOH withdrawal. Patient was 

given Geodon 20mg IM x1 and Ativan 2mg IV x1 tonight for agitation. Pt 

currently asleep and HR is mild tachy in low 100's. EKG done in ICU upon 

transfer showed NSR with HR 98. Mild Hypokalemia noted. Urine culture indicates 

G-rods UTI. 





Plan: 





Alcohol withdrawal ass/w anxiety


-Patient a chronic alcoholic.


-Multiple admissions with alcohol.


-Last night (9/6/17)patient had a Rapid Response called on him and was given 

Geodon and Librium and put in 2 point restraints.  Patient remains in 

restraints. Reassess patient in the morning.


-Continue Ativan 2mg Q2h [facility].


-Discussed with patient if he was ready to quit drinking.  Patient is open to 

get help for his drinking problem.  Will discuss more with the patient tomorrow.


-Fall protocols and Seizure protocols.





UTI


-U/A showed (+)Leukocyte Esterase and blood. Urine Culture Pending.


-Continue Rocephin. Patient denying in urinary symptoms currently. Patient 

afebrile and no leukocytosis.





Nausea and vomiting 2/2 likely to gastroenteritis


-Patient received 1L NS banana bag and zofran in ED.


-Patient symptoms resolved.  Abdominal exam benign.


-Phosphorus(3.4) and Magnesium (2.1)


-Continue Zofran PRN.





Transaminitis


-most likely due to chronic alcohol abuse.


-AST:104 and ALT:96





Hypertension


-continue home medications





GI ppx


-Continue Protonix





DVT ppx


-SCD's








<Obdulio Irene - Last Filed: 09/11/17 18:55>





Objective





- Vital Signs/Intake and Output


Vital Signs (last 24 hours): 


 











Temp Pulse Resp BP Pulse Ox


 


 98.1 F   86   18   143/82   97 


 


 09/11/17 08:00  09/11/17 10:37  09/11/17 08:00  09/11/17 10:37  09/11/17 08:00








Intake and Output: 


 











 09/11/17 09/11/17





 06:59 18:59


 


Intake Total 1880 


 


Output Total 800 


 


Balance 1080 














- Labs


Labs: 


 





 09/11/17 07:21 





 09/11/17 07:21 











Attending/Attestation





- Attestation


I have personally seen and examined this patient.: Yes


I have fully participated in the care of the patient.: Yes


I have reviewed all pertinent clinical information, including history, physical 

exam and plan: Yes


Notes (Text): 





09/11/17 18:54


attending note;





Patient seen and examined with the resident.





Patient is a 36-year-old well-known to our service is admitted to alcohol 

withdrawal.


Treated with IV banana bag and IV Ativan.





Improving slowly.





Monitor closely.

## 2017-09-11 VITALS — RESPIRATION RATE: 18 BRPM

## 2017-09-11 VITALS — TEMPERATURE: 98.1 F | OXYGEN SATURATION: 97 %

## 2017-09-11 VITALS — HEART RATE: 86 BPM | DIASTOLIC BLOOD PRESSURE: 82 MMHG | SYSTOLIC BLOOD PRESSURE: 143 MMHG

## 2017-09-11 LAB
ALBUMIN/GLOB SERPL: 1.3 {RATIO} (ref 1.1–1.8)
ALP SERPL-CCNC: 83 U/L (ref 38–126)
ALT SERPL-CCNC: 121 U/L (ref 7–56)
AST SERPL-CCNC: 102 U/L (ref 17–59)
BASOPHILS # BLD AUTO: 0.05 K/MM3 (ref 0–2)
BASOPHILS NFR BLD: 1.2 % (ref 0–3)
BILIRUB SERPL-MCNC: 1 MG/DL (ref 0.2–1.3)
BUN SERPL-MCNC: 6 MG/DL (ref 7–21)
CALCIUM SERPL-MCNC: 9.8 MG/DL (ref 8.4–10.5)
CHLORIDE SERPL-SCNC: 109 MMOL/L (ref 95–110)
CO2 SERPL-SCNC: 23 MMOL/L (ref 21–33)
EOSINOPHIL # BLD: 0.3 10*3/UL (ref 0–0.7)
EOSINOPHIL NFR BLD: 6.5 % (ref 1.5–5)
ERYTHROCYTE [DISTWIDTH] IN BLOOD BY AUTOMATED COUNT: 12.9 % (ref 11.5–14.5)
GLOBULIN SER-MCNC: 3.2 GM/DL
GLUCOSE SERPL-MCNC: 96 MG/DL (ref 70–110)
GRANULOCYTES # BLD: 1.83 10*3/UL (ref 1.4–6.5)
GRANULOCYTES NFR BLD: 42.4 % (ref 50–68)
HCT VFR BLD CALC: 41.3 % (ref 42–52)
LYMPHOCYTES # BLD: 1.4 10*3/UL (ref 1.2–3.4)
LYMPHOCYTES NFR BLD AUTO: 33.4 % (ref 22–35)
MCH RBC QN AUTO: 34.5 PG (ref 25–35)
MCHC RBC AUTO-ENTMCNC: 34.6 G/DL (ref 31–37)
MCV RBC AUTO: 99.5 FL (ref 80–105)
MONOCYTES # BLD AUTO: 0.7 10*3/UL (ref 0.1–0.6)
MONOCYTES NFR BLD: 16.5 % (ref 1–6)
PLATELET # BLD: 179 10^3/UL (ref 120–450)
PMV BLD AUTO: 8.8 FL (ref 7–11)
POTASSIUM SERPL-SCNC: 3.7 MMOL/L (ref 3.6–5)
PROT SERPL-MCNC: 7.4 G/DL (ref 5.8–8.3)
SODIUM SERPL-SCNC: 143 MMOL/L (ref 132–148)
WBC # BLD AUTO: 4.3 10^3/UL (ref 4.5–11)

## 2017-09-11 RX ADMIN — THERA TABS SCH TAB: TAB at 10:48

## 2017-09-11 RX ADMIN — CEFTRIAXONE SCH MLS/HR: 1 INJECTION, SOLUTION INTRAVENOUS at 10:49

## 2017-09-11 NOTE — CP.PCM.DIS
<KeeganDieudonnen - Last Filed: 09/11/17 11:29>





Provider





- Provider


Date of Admission: 


09/07/17 15:00





Attending physician: 


Obdulio Irene MD





Primary care physician: 


NO PRIMARY CARE PROVIDER





Time Spent in preparation of Discharge (in minutes): 40





Hospital Course





- Lab Results


Lab Results: 


 Micro Results





09/10/17 14:10   Urine,Clean Catch   Urine Culture - Preliminary


                            No growth.





 Most Recent Lab Values











WBC  4.3 10^3/ul (4.5-11.0)  L  09/11/17  07:21    


 


RBC  4.15 10^6/uL (3.5-6.1)   09/11/17  07:21    


 


Hgb  14.3 g/dL (14.0-18.0)   09/11/17  07:21    


 


Hct  41.3 % (42.0-52.0)  L  09/11/17  07:21    


 


MCV  99.5 fl (80.0-105.0)   09/11/17  07:21    


 


MCH  34.5 pg (25.0-35.0)   09/11/17  07:21    


 


MCHC  34.6 g/dl (31.0-37.0)   09/11/17  07:21    


 


RDW  12.9 % (11.5-14.5)   09/11/17  07:21    


 


Plt Count  179 10^3/uL (120.0-450.0)   09/11/17  07:21    


 


MPV  8.8 fl (7.0-11.0)   09/11/17  07:21    


 


Gran %  42.4 % (50.0-68.0)  L  09/11/17  07:21    


 


Lymph % (Auto)  33.4 % (22.0-35.0)   09/11/17  07:21    


 


Mono % (Auto)  16.5 % (1.0-6.0)  H  09/11/17  07:21    


 


Eos % (Auto)  6.5 % (1.5-5.0)  H  09/11/17  07:21    


 


Baso % (Auto)  1.2 % (0.0-3.0)   09/11/17  07:21    


 


Gran #  1.83  (1.4-6.5)   09/11/17  07:21    


 


Lymph #  1.4  (1.2-3.4)   09/11/17  07:21    


 


Mono #  0.7  (0.1-0.6)  H  09/11/17  07:21    


 


Eos #  0.3  (0.0-0.7)   09/11/17  07:21    


 


Baso #  0.05 K/mm3 (0.0-2.0)   09/11/17  07:21    


 


Sodium  143 mmol/L (132-148)   09/11/17  07:21    


 


Potassium  3.7 mmol/L (3.6-5.0)   09/11/17  07:21    


 


Chloride  109 mmol/L ()   09/11/17  07:21    


 


Carbon Dioxide  23 mmol/L (21-33)   09/11/17  07:21    


 


Anion Gap  15  (10-20)   09/11/17  07:21    


 


BUN  6 mg/dL (7-21)  L  09/11/17  07:21    


 


Creatinine  0.7 mg/dL (0.5-1.4)   09/11/17  07:21    


 


Est GFR ( Amer)  > 60   09/11/17  07:21    


 


Est GFR (Non-Af Amer)  > 60   09/11/17  07:21    


 


Random Glucose  96 mg/dL ()   09/11/17  07:21    


 


Calcium  9.8 mg/dL (8.4-10.5)   09/11/17  07:21    


 


Phosphorus  3.4 mg/dL (2.5-4.5)   09/05/17  20:09    


 


Magnesium  2.1 mg/dL (1.7-2.2)   09/08/17  06:10    


 


Total Bilirubin  1.0 mg/dL (0.2-1.3)   09/11/17  07:21    


 


AST  102 U/L (17-59)  H  09/11/17  07:21    


 


ALT  121 U/L (7-56)  H  09/11/17  07:21    


 


Alkaline Phosphatase  83 U/L ()   09/11/17  07:21    


 


Total Protein  7.4 g/dL (5.8-8.3)   09/11/17  07:21    


 


Albumin  4.2 g/dL (3.0-4.8)   09/11/17  07:21    


 


Globulin  3.2 gm/dL  09/11/17  07:21    


 


Albumin/Globulin Ratio  1.3  (1.1-1.8)   09/11/17  07:21    


 


Lipase  209 U/L ()   09/05/17  20:09    


 


Urine Color  Yellow  (YELLOW)   09/10/17  14:10    


 


Urine Appearance  Clear  (CLEAR)   09/10/17  14:10    


 


Urine pH  6.0  (4.7-8.0)   09/10/17  14:10    


 


Ur Specific Gravity  1.010  (1.005-1.035)   09/10/17  14:10    


 


Urine Protein  Negative mg/dL (<30 mg/dL)   09/10/17  14:10    


 


Urine Glucose (UA)  Negative mg/dL (NEGATIVE)   09/10/17  14:10    


 


Urine Ketones  Negative mg/dL (NEGATIVE)   09/10/17  14:10    


 


Urine Blood  Negative  (NEGATIVE)   09/10/17  14:10    


 


Urine Nitrate  Negative  (NEGATIVE)   09/10/17  14:10    


 


Urine Bilirubin  Negative  (NEGATIVE)   09/10/17  14:10    


 


Urine Urobilinogen  1.0 E.U./dL (<1 E.U./dL)  H  09/10/17  14:10    


 


Ur Leukocyte Esterase  Trace Ginny/uL (NEGATIVE)  H  09/10/17  14:10    


 


Urine RBC  0 - 2 /hpf (0-2)   09/10/17  14:10    


 


Urine WBC  1 - 3 /hpf (0-6)   09/10/17  14:10    


 


Ur Epithelial Cells  None /hpf (0-5)   09/10/17  14:10    


 


Amorphous Sediment  Trace   09/10/17  14:10    


 


Urine Bacteria  Mod  (NEG)   09/10/17  14:10    


 


Alcohol, Quantitative  74 mg/dL (0-10)  H  09/05/17  20:09    














- Hospital Course


Hospital Course: 





35M with PMH HTN, alcoholism, presents for diffuse abdominal pain x 2 days PTA. 

He describes it crampy, squeezing, constant, denies radiation, worsening today, 

with associated multiple nonbloody, nonbilious vomiting and watery diarrhea 

today.  Vomiting occurs right after food intake, has chronic + tremors. Denies 

fever, chills, blurry vision, cp, sob, cough, palpitations, dizziness, weakness

, leg swelling. he c/o dysuria, denies frequency, urgency, hematuria, or back 

pain.9/5: Alcohol=74.Patient was transmitted downstairs after having increased 

agitation and tachycardia. Patient was started on Precedix drip.  Patient 

completed that and was transferred back up to the 5th floor were he was 

monitored for alcohol withdrawals. 9/10: No change in management,. Completed 

last banana bag last night.


9/11: Patient was examined at bedside and tremors have subsided.  Patient is 

able to ambulate on his own with no difficulties.  Patient will be discharged 

and strongly advised to quit drinking and seek help and to also follow up with 

his PMD within one week of discharge.





Discharge Exam





- Head Exam


Head Exam: ATRAUMATIC, NORMAL INSPECTION, NORMOCEPHALIC





- Eye Exam


Eye Exam: EOMI, Normal appearance, PERRL


Pupil Exam: NORMAL ACCOMODATION, PERRL





- Respiratory Exam


Respiratory Exam: Clear to PA & Lateral, NORMAL BREATHING PATTERN, 

UNREMARKABLE.  absent: Accessory Muscle Use, Chest Wall Tenderness, Wheezes





- Cardiovascular Exam


Cardiovascular Exam: REGULAR RHYTHM, RRR, +S1, +S2.  absent: Gallop, Rubs





- GI/Abdominal Exam


GI & Abdominal Exam: Normal Bowel Sounds, Soft.  absent: Guarding, Hernia, Rigid





- Back Exam


Back exam: NORMAL INSPECTION.  absent: CVA tenderness (L), CVA tenderness (R), 

paraspinal tenderness





- Neurological Exam


Neurological exam: Alert, CN II-XII Intact, Normal Gait, Oriented x3





- Psychiatric Exam


Psychiatric exam: Normal Affect, Normal Mood





- Skin


Skin Exam: Dry, Intact, Normal Color





Discharge Plan





- Discharge Medications


Prescriptions: 


chlordiazePOXIDE [Chlordiazepoxide HCl] 50 mg PO TID #6 cap


Famotidine [Pepcid] 20 mg PO DAILY #30 tab


Folic Acid 1 mg PO DAILY #30 tab


Lisinopril [Zestril] 20 mg PO DAILY #30 tablet


Multivitamin [Daily Vitamin Formula] 1 each PO DAILY #30 tablet


Sodium Chloride [[facility] Saline Nasal [place] [address] ] 1 ml NS BID #1 

spr


Thiamine [Vitamin B1 Tab] 100 mg PO DAILY #30 tab





- Follow Up Plan


Condition: STABLE


Disposition: HOME/ ROUTINE


Instructions:  Regular Diet (DC), Alcohol Intoxication (DC), At-Risk Alcohol 

Use (DC), Non-Alcoholic Fatty Liver Disease (DC), Alcohol Use Disorder (DC)


Additional Instructions: 


Patient should follow up with PMD(Dr. Vanessa) within one week. 


Patient advised to seek help with alcohol abuse.


Patient should return to E.D. for any new or worsening symptoms.


Referrals: 


PCP,NO [Primary Care Provider] - 





<Obdulio Irene - Last Filed: 09/11/17 18:57>





Provider





- Provider


Date of Admission: 


09/07/17 15:00





Attending physician: 


Obdulio Irene MD





Primary care physician: 


STEFANIE PRIMARY CARE PROVIDER








Hospital Course





- Lab Results


Lab Results: 


 Micro Results





09/10/17 14:10   Urine,Clean Catch   Urine Culture - Preliminary


                            No growth.





 Most Recent Lab Values











WBC  4.3 10^3/ul (4.5-11.0)  L  09/11/17  07:21    


 


RBC  4.15 10^6/uL (3.5-6.1)   09/11/17  07:21    


 


Hgb  14.3 g/dL (14.0-18.0)   09/11/17  07:21    


 


Hct  41.3 % (42.0-52.0)  L  09/11/17  07:21    


 


MCV  99.5 fl (80.0-105.0)   09/11/17  07:21    


 


MCH  34.5 pg (25.0-35.0)   09/11/17  07:21    


 


MCHC  34.6 g/dl (31.0-37.0)   09/11/17  07:21    


 


RDW  12.9 % (11.5-14.5)   09/11/17  07:21    


 


Plt Count  179 10^3/uL (120.0-450.0)   09/11/17  07:21    


 


MPV  8.8 fl (7.0-11.0)   09/11/17  07:21    


 


Gran %  42.4 % (50.0-68.0)  L  09/11/17  07:21    


 


Lymph % (Auto)  33.4 % (22.0-35.0)   09/11/17  07:21    


 


Mono % (Auto)  16.5 % (1.0-6.0)  H  09/11/17  07:21    


 


Eos % (Auto)  6.5 % (1.5-5.0)  H  09/11/17  07:21    


 


Baso % (Auto)  1.2 % (0.0-3.0)   09/11/17  07:21    


 


Gran #  1.83  (1.4-6.5)   09/11/17  07:21    


 


Lymph #  1.4  (1.2-3.4)   09/11/17  07:21    


 


Mono #  0.7  (0.1-0.6)  H  09/11/17  07:21    


 


Eos #  0.3  (0.0-0.7)   09/11/17  07:21    


 


Baso #  0.05 K/mm3 (0.0-2.0)   09/11/17  07:21    


 


Sodium  143 mmol/L (132-148)   09/11/17  07:21    


 


Potassium  3.7 mmol/L (3.6-5.0)   09/11/17  07:21    


 


Chloride  109 mmol/L ()   09/11/17  07:21    


 


Carbon Dioxide  23 mmol/L (21-33)   09/11/17  07:21    


 


Anion Gap  15  (10-20)   09/11/17  07:21    


 


BUN  6 mg/dL (7-21)  L  09/11/17  07:21    


 


Creatinine  0.7 mg/dL (0.5-1.4)   09/11/17  07:21    


 


Est GFR ( Amer)  > 60   09/11/17  07:21    


 


Est GFR (Non-Af Amer)  > 60   09/11/17  07:21    


 


Random Glucose  96 mg/dL ()   09/11/17  07:21    


 


Calcium  9.8 mg/dL (8.4-10.5)   09/11/17  07:21    


 


Phosphorus  3.4 mg/dL (2.5-4.5)   09/05/17  20:09    


 


Magnesium  2.1 mg/dL (1.7-2.2)   09/08/17  06:10    


 


Total Bilirubin  1.0 mg/dL (0.2-1.3)   09/11/17  07:21    


 


AST  102 U/L (17-59)  H  09/11/17  07:21    


 


ALT  121 U/L (7-56)  H  09/11/17  07:21    


 


Alkaline Phosphatase  83 U/L ()   09/11/17  07:21    


 


Total Protein  7.4 g/dL (5.8-8.3)   09/11/17  07:21    


 


Albumin  4.2 g/dL (3.0-4.8)   09/11/17  07:21    


 


Globulin  3.2 gm/dL  09/11/17  07:21    


 


Albumin/Globulin Ratio  1.3  (1.1-1.8)   09/11/17  07:21    


 


Lipase  209 U/L ()   09/05/17  20:09    


 


Urine Color  Yellow  (YELLOW)   09/10/17  14:10    


 


Urine Appearance  Clear  (CLEAR)   09/10/17  14:10    


 


Urine pH  6.0  (4.7-8.0)   09/10/17  14:10    


 


Ur Specific Gravity  1.010  (1.005-1.035)   09/10/17  14:10    


 


Urine Protein  Negative mg/dL (<30 mg/dL)   09/10/17  14:10    


 


Urine Glucose (UA)  Negative mg/dL (NEGATIVE)   09/10/17  14:10    


 


Urine Ketones  Negative mg/dL (NEGATIVE)   09/10/17  14:10    


 


Urine Blood  Negative  (NEGATIVE)   09/10/17  14:10    


 


Urine Nitrate  Negative  (NEGATIVE)   09/10/17  14:10    


 


Urine Bilirubin  Negative  (NEGATIVE)   09/10/17  14:10    


 


Urine Urobilinogen  1.0 E.U./dL (<1 E.U./dL)  H  09/10/17  14:10    


 


Ur Leukocyte Esterase  Trace Ginyn/uL (NEGATIVE)  H  09/10/17  14:10    


 


Urine RBC  0 - 2 /hpf (0-2)   09/10/17  14:10    


 


Urine WBC  1 - 3 /hpf (0-6)   09/10/17  14:10    


 


Ur Epithelial Cells  None /hpf (0-5)   09/10/17  14:10    


 


Amorphous Sediment  Trace   09/10/17  14:10    


 


Urine Bacteria  Mod  (NEG)   09/10/17  14:10    


 


Alcohol, Quantitative  74 mg/dL (0-10)  H  09/05/17  20:09    














Attending/Attestation





- Attestation


I have personally seen and examined this patient.: Yes


I have fully participated in the care of the patient.: Yes


I have reviewed all pertinent clinical information, including history, physical 

exam and plan: Yes


Notes (Text): 





09/11/17 18:56


attending note;





Patient seen and examined with the resident.





Patient is a 36-year-old well-known to our service is admitted to alcohol 

withdrawal.


Treated with IV banana bag and IV Ativan.


Treated with precedex drip in ICU.





Patient is currently alert, awake and oriented.


Tolerating diet well.





Ambulating fine.





Information about AA meetings and rehabilitation given.





Patient is advised to follow-up with PMD Dr. Vanessa.





Diagnosis;


Chronic alcohol abuse


Severe alcohol withdrawal symptoms


Noncompliance with follow-up

## 2017-12-15 ENCOUNTER — HOSPITAL ENCOUNTER (INPATIENT)
Dept: HOSPITAL 42 - ED | Age: 37
LOS: 1 days | Discharge: LEFT BEFORE DISCHARGE | DRG: 749 | End: 2017-12-16
Attending: INTERNAL MEDICINE | Admitting: INTERNAL MEDICINE
Payer: MEDICAID

## 2017-12-15 VITALS — BODY MASS INDEX: 25 KG/M2

## 2017-12-15 DIAGNOSIS — R40.2412: ICD-10-CM

## 2017-12-15 DIAGNOSIS — R26.81: ICD-10-CM

## 2017-12-15 DIAGNOSIS — J02.9: ICD-10-CM

## 2017-12-15 DIAGNOSIS — Z87.891: ICD-10-CM

## 2017-12-15 DIAGNOSIS — I10: ICD-10-CM

## 2017-12-15 DIAGNOSIS — F10.239: Primary | ICD-10-CM

## 2017-12-15 DIAGNOSIS — N39.0: ICD-10-CM

## 2017-12-15 DIAGNOSIS — R74.0: ICD-10-CM

## 2017-12-15 LAB
ALBUMIN/GLOB SERPL: 1.5 {RATIO} (ref 1.1–1.8)
ALP SERPL-CCNC: 103 U/L (ref 38–126)
ALT SERPL-CCNC: 119 U/L (ref 7–56)
AMYLASE SERPL-CCNC: 97 U/L (ref 35–125)
APTT BLD: 29.3 SECONDS (ref 25.1–36.5)
AST SERPL-CCNC: 125 U/L (ref 17–59)
BASOPHILS # BLD AUTO: 0.05 K/MM3 (ref 0–2)
BASOPHILS NFR BLD: 1.3 % (ref 0–3)
BILIRUB SERPL-MCNC: 1.6 MG/DL (ref 0.2–1.3)
BUN SERPL-MCNC: 4 MG/DL (ref 7–21)
CALCIUM SERPL-MCNC: 10.2 MG/DL (ref 8.4–10.5)
CHLORIDE SERPL-SCNC: 102 MMOL/L (ref 98–107)
CO2 SERPL-SCNC: 24 MMOL/L (ref 21–33)
EOSINOPHIL # BLD: 0 10*3/UL (ref 0–0.7)
EOSINOPHIL NFR BLD: 0.8 % (ref 1.5–5)
ERYTHROCYTE [DISTWIDTH] IN BLOOD BY AUTOMATED COUNT: 13.3 % (ref 11.5–14.5)
GLOBULIN SER-MCNC: 3.4 GM/DL
GLUCOSE SERPL-MCNC: 110 MG/DL (ref 70–110)
GRANULOCYTES # BLD: 2.39 10*3/UL (ref 1.4–6.5)
GRANULOCYTES NFR BLD: 64.5 % (ref 50–68)
HCT VFR BLD CALC: 42.4 % (ref 42–52)
INR PPP: 1.06 (ref 0.93–1.08)
LIPASE SERPL-CCNC: 278 U/L (ref 23–300)
LYMPHOCYTES # BLD: 0.7 10*3/UL (ref 1.2–3.4)
LYMPHOCYTES NFR BLD AUTO: 17.5 % (ref 22–35)
MCH RBC QN AUTO: 34.9 PG (ref 25–35)
MCHC RBC AUTO-ENTMCNC: 35.1 G/DL (ref 31–37)
MCV RBC AUTO: 99.3 FL (ref 80–105)
MONOCYTES # BLD AUTO: 0.6 10*3/UL (ref 0.1–0.6)
MONOCYTES NFR BLD: 15.9 % (ref 1–6)
PLATELET # BLD: 164 10^3/UL (ref 120–450)
PMV BLD AUTO: 8.5 FL (ref 7–11)
POTASSIUM SERPL-SCNC: 4.5 MMOL/L (ref 3.6–5)
PROT SERPL-MCNC: 8.3 G/DL (ref 5.8–8.3)
SODIUM SERPL-SCNC: 138 MMOL/L (ref 132–148)
TROPONIN I SERPL-MCNC: < 0.01 NG/ML
WBC # BLD AUTO: 3.7 10^3/UL (ref 4.5–11)

## 2017-12-15 NOTE — CP.PCM.HP
<Kobe Allison - Last Filed: 12/16/17 01:50>





History of Present Illness





- History of Present Illness


History of Present Illness: 





37 year old male with a past medical history of alcohol abuse and hypertension 

who comes in for alcohol withdrawal. The patient also is reporting multiple 

episodes of non-bloody, non-bilious emesis that began earlier today.  The 

patient reports that his last drink was at 2 p.m. today and he drank two 24oz 

beers at that time.  The patient denies any chest pain, shortness of breath, 

fevers, chills, lightheadedness, dizziness, changes in vision, sick contacts, 

recent travel, syncopal episodes or any other complaints.





Primary medical doctor: Dr. Mead





Past medical history: See HPI


Medications: Lisinopril 10mg PO Daily


Allergies: Denies


Past surgical history :Denies


Social history: Reports drinking everyday 8 to 9 24 ounce beers a day. Lives 

with his girlfriend. Denies illicit drug use.





Present on Admission





- Present on Admission


Any Indicators Present on Admission: No





Review of Systems





- Constitutional


Constitutional: As Per HPI





- EENT


Eyes: As Per HPI


Ears: As Per HPI


Nose/Mouth/Throat: As Per HPI





- Cardiovascular


Cardiovascular: As Per HPI





- Respiratory


Respiratory: As Per HPI





- Gastrointestinal


Gastrointestinal: As Per HPI





- Musculoskeletal


Musculoskeletal: As Per HPI





- Integumentary


Integumentary: As Per HPI





- Neurological


Neurological: As Per HPI





- Psychiatric


Psychiatric: As Per HPI





- Endocrine


Endocrine: As Per HPI





Past Patient History





- Infectious Disease


Hx of Infectious Diseases: None





- Tetanus Immunizations


Tetanus Immunization: Unknown





- Past Social History


Smoking Status: Former Smoker





- CARDIAC


Hx Cardiac Disorders: Yes (Palpitations)


Hx Hypertension: Yes





- PULMONARY


Hx Respiratory Disorders: Yes (pharygitis)


Other/Comment: PHARYNGITIS





- NEUROLOGICAL


Hx Neurological Disorder: No





- HEENT


Hx HEENT Problems: No





- RENAL


Hx Chronic Kidney Disease: No





- ENDOCRINE/METABOLIC


Hx Endocrine Disorders: No





- HEMATOLOGICAL/ONCOLOGICAL


Hx Blood Disorders: No





- INTEGUMENTARY


Hx Dermatological Problems: No





- MUSCULOSKELETAL/RHEUMATOLOGICAL


Hx Falls: Yes


Hx Unsteady Gait: Yes





- GASTROINTESTINAL


Hx Gastrointestinal Disorders: No





- GENITOURINARY/GYNECOLOGICAL


Hx Genitourinary Disorders: No





- PSYCHIATRIC


Hx Anxiety: Yes


Hx Depression: Yes


Hx Hallucinations: Yes


Hx Substance Use: Yes (ETOH)


Other/Comment: ETOH ABUSE 10 BEERS A DAY





- SURGICAL HISTORY


Hx Surgeries: No





- ANESTHESIA


Hx Anesthesia: No


Hx Anesthesia Reactions: No


Hx Malignant Hyperthermia: No





Meds


Allergies/Adverse Reactions: 


 Allergies











Allergy/AdvReac Type Severity Reaction Status Date / Time


 


No Known Allergies Allergy   Verified 12/15/17 20:16














Physical Exam





- Head Exam


Head Exam: ATRAUMATIC, NORMAL INSPECTION, NORMOCEPHALIC





- Eye Exam


Eye Exam: EOMI, Normal appearance, PERRL.  absent: Periorbital tenderness


Pupil Exam: NORMAL ACCOMODATION, PERRL.  absent: Irregular, Unequal





- ENT Exam


ENT Exam: Mucous Membranes Moist, Normal Exam, Normal Oropharynx





- Neck Exam


Neck exam: Negative for: Lymphadenopathy, Thyromegaly





- Respiratory Exam


Respiratory Exam: Clear to Auscultation Bilateral, NORMAL BREATHING PATTERN.  

absent: Chest Wall Tenderness, Prolonged Expiratory Phase, Respiratory Distress





- Cardiovascular Exam


Cardiovascular Exam: REGULAR RHYTHM, +S1, +S2





- GI/Abdominal Exam


GI & Abdominal Exam: Normal Bowel Sounds, Soft.  absent: Hypoactive Bowel Sounds

, Organomegaly, Tenderness





- Extremities Exam


Additional comments: 





bilaterally scabs areas on the anterior shins.





- Back Exam


Back exam: NORMAL INSPECTION.  absent: CVA tenderness (L), CVA tenderness (R), 

paraspinal tenderness





- Neurological Exam


Neurological exam: Alert, CN II-XII Intact, Oriented x3





- Psychiatric Exam


Psychiatric exam: Normal Affect, Normal Mood





- Skin


Skin Exam: Abrasion, Dry, Normal Color, Warm





Results





- Vital Signs


Recent Vital Signs: 





 Last Vital Signs











Temp  98.7 F   12/15/17 20:13


 


Pulse  115 H  12/15/17 20:13


 


Resp  18   12/15/17 20:13


 


BP  130/79   12/15/17 20:13


 


Pulse Ox  98   12/15/17 20:13














- Labs


Result Diagrams: 


 12/15/17 21:00





 12/15/17 21:00





Assessment & Plan





- Assessment and Plan (Free Text)


Assessment: 





37 year old male with a past medical history of hypertension and alcohol abuse 

who is being admitted for alcohol withdrawal.


Plan: 





1. Alcohol withdrawal


-Alcohol level was negative in the emergency department.


-Patient reports his last drink was at 2 p.m. today. 


-Ativan 2q4 JOVANA and Ativan 2q2PRN for withdrawal symptoms. Librium should be 

avoided at this time with the elevation of liver enzymes.


-Banana bag. Will switch to PO Multivitamin, Folic acid and Thiamine afterwards.


-Seizures precautions. 


-Fall protocols, CIWA protocols.





2. Nausea, vomiting


-Zofran


-Will keep NPO. Advance diet once patient no longer symptomatic.





3. Transaminitis


-likely secondary to alcohol abuse.


-Would consider hepatitis panel if liver enzymes remain elevated. Will continue 

to monitor.





4. Hypertension


-Restart home medications.





GI ppx


-Protonix


DVT PPX


-SCD's





<Bibiana Blas - Last Filed: 12/16/17 03:10>





Results





- Vital Signs


Recent Vital Signs: 





 Last Vital Signs











Temp  98.6 F   12/15/17 23:35


 


Pulse  93 H  12/15/17 23:35


 


Resp  18   12/15/17 23:35


 


BP  134/86   12/15/17 23:35


 


Pulse Ox  98   12/15/17 22:42














- Labs


Result Diagrams: 


 12/15/17 21:00





 12/15/17 21:00





Attending/Attestation





- Attestation


I have personally seen and examined this patient.: Yes


I have fully participated in the care of the patient.: Yes


I have reviewed all pertinent clinical information: Yes


Notes (Text): 





12/16/17 03:09


Patient was seen when he was in bed # 364-01.


Agree with history, physical examination, assessment and plan.

## 2017-12-15 NOTE — ED PDOC
Arrival/HPI





- General


Chief Complaint: Alcohol Ingestion


Time Seen by Provider: 12/15/17 20:04


Historian: Patient





- History of Present Illness


Narrative History of Present Illness (Text): 





12/15/17 20:23


Matt Dodge is a 37 year old male, with a history of alcohol abuse, 

presents to the Emergency department complaining of vomiting and tremors since 

2pm today. Patient admits to consuming alcohol prior to onset at 2pm and denies 

any consumption afterwards. He informs he has not been able to keep anything 

down since onset. He states that his vomit only contains his food and denies 

any blood or coffee ground emesis. Patient additionally denies any chest pain, 

shortness of breath, fevers, chills, trauma or any other complaints. 





Time/Duration: 4-6 hours


Symptom Course: Unchanged


Activities at Onset: Light


Context: Home





Past Medical History





- Provider Review


Nursing Documentation Reviewed: Yes





- Infectious Disease


Hx of Infectious Diseases: None





- Tetanus Immunization


Tetanus Immunization: Unknown





- Cardiac


Hx Cardiac Disorders: Yes (Palpitations)


Hx Hypertension: Yes





- Pulmonary


Hx Respiratory Disorders: Yes (pharygitis)


Other/Comment: PHARYNGITIS





- Neurological


Hx Neurological Disorder: No





- HEENT


Hx HEENT Disorder: No





- Renal


Hx Renal Disorder: No





- Endocrine/Metabolic


Hx Endocrine Disorders: No





- Hematological/Oncological


Hx Blood Disorders: No





- Integumentary


Hx Dermatological Disorder: No





- Musculoskeletal/Rheumatological


Hx Falls: Yes


Hx Unsteady Gait: Yes





- Gastrointestinal


Hx Gastrointestinal Disorders: No





- Genitourinary/Gynecological


Hx Genitourinary Disorders: No





- Psychiatric


Hx Anxiety: Yes


Hx Depression: Yes


Hx Hallucinations: Yes


Hx Substance Use: Yes (ETOH)


Other/Comment: ETOH ABUSE 10 BEERS A DAY





- Past Surgical History


Past Surgical History: No Previous





- Anesthesia


Hx Anesthesia: No


Hx Anesthesia Reactions: No


Hx Malignant Hyperthermia: No





- Suicidal Assessment


Feels Threatened In Home Enviroment: No





Family/Social History





- Physician Review


Nursing Documentation Reviewed: Yes


Family/Social History: Unknown Family HX


Smoking Status: Former Smoker


Hx Alcohol Use: Yes


Frequency of alcohol use: Daily


Hx Substance Use: Yes (ETOH)


Hx Substance Use Treatment: No





Allergies/Home Meds


Allergies/Adverse Reactions: 


Allergies





No Known Allergies Allergy (Verified 12/15/17 20:16)


 








Home Medications: 


 Home Meds











 Medication  Instructions  Recorded  Confirmed


 


No Known Home Med  12/15/17 12/15/17














Review of Systems





- Physician Review


All systems were reviewed & negative as marked: Yes





- Review of Systems


Constitutional: Other (Tremors ).  absent: Fevers, Night Sweats


Eyes: Normal


ENT: Normal


Respiratory: Normal.  absent: SOB


Cardiovascular: Normal.  absent: Chest Pain


Gastrointestinal: Vomiting.  absent: Hematemesis


Genitourinary Male: Normal


Musculoskeletal: Other (tremors)


Skin: Normal


Neurological: Normal


Endocrine: Normal


Hemo/Lymphatic: Normal


Psychiatric: Normal





Physical Exam


Vital Signs











  Temp Pulse Resp BP Pulse Ox


 


 12/15/17 20:13  98.7 F  115 H  18  130/79  98











Temperature: Afebrile


Blood Pressure: Normal


Pulse: Tachycardic


Respiratory Rate: Normal


Appearance: Positive for: Well-Appearing, Non-Toxic, Comfortable


Pain Distress: None


Mental Status: Positive for: Alert and Oriented X 3





- Systems Exam


Head: Present: Atraumatic, Normocephalic


Pupils: Present: PERRL


Extroacular Muscles: Present: EOMI


Conjunctiva: Present: Normal


Mouth: Present: Moist Mucous Membranes


Neck: Present: Normal Range of Motion


Respiratory/Chest: Present: Clear to Auscultation, Good Air Exchange.  No: 

Respiratory Distress, Accessory Muscle Use


Cardiovascular: Present: Regular Rate and Rhythm, Normal S1, S2.  No: Murmurs


Abdomen: Present: Normal Bowel Sounds.  No: Tenderness, Distention, Peritoneal 

Signs


Back: Present: Normal Inspection


Upper Extremity: Present: Other (mildly tremulous).  No: Cyanosis, Edema


Lower Extremity: Present: Normal Inspection.  No: Edema


Neurological: Present: GCS=15, CN II-XII Intact, Speech Normal, Other (mildly 

tremulous )


Skin: Present: Warm, Dry, Normal Color.  No: Rashes


Psychiatric: Present: Alert, Oriented x 3, Normal Insight, Normal Concentration





Medical Decision Making


ED Course and Treatment: 





12/15/17 20:23


Impression:





Differential Diagnosis included but are not limited to:  





Plan:


--EKG


--Labs, Alcohol serum


--Chest X-ray 


--Ativan 


--Zofran 


--IV fluids 


--Urinalysis 


-- Reassess and disposition





Prior Visits:





12/15/17 20:35 


EKG:


Ordered, reviewed, and independently interpreted the EKG.


Rate : 109 BPM


Rhythm : Sinus Tachycardia.


Interpretation : No ST-segment elevations or depressions, no T-wave inversions, 

normal intervals.





12/15/17 20:35 


Chest X-Ray reviewed, negative and shows no acute processes. 





12/15/17 21:51


Case discussed with Dr. Irene, who is aware and agrees with plan. Patient 

will be admitted to remote Telemetry under her service for alcohol withdrawal. 








- Lab Interpretations


Lab Results: 








 12/15/17 21:00 





 12/15/17 21:00 





 Lab Results





12/15/17 21:00: Alcohol, Quantitative < 10


12/15/17 21:00: Sodium 138, Potassium 4.5, Chloride 102, Carbon Dioxide 24, 

Anion Gap 17, BUN 4 L, Creatinine 0.7 L, Est GFR ( Amer) > 60, Est GFR (

Non-Af Amer) > 60, Random Glucose 110, Calcium 10.2, Total Bilirubin 1.6 H, AST 

125 H D,  H, Alkaline Phosphatase 103, Lactate Dehydrogenase 581, Total 

Creatine Kinase 200, Troponin I < 0.01, Total Protein 8.3, Albumin 4.9 H, 

Globulin Pending, Albumin/Globulin Ratio Pending, Amylase 97, Lipase 278


12/15/17 21:00: PT 11.6, INR 1.06, APTT 29.3


12/15/17 21:00: WBC 3.7 L, RBC 4.27, Hgb 14.9, Hct 42.4, MCV 99.3, MCH 34.9, 

MCHC 35.1, RDW 13.3, Plt Count 164, MPV 8.5, Gran % 64.5, Lymph % (Auto) 17.5 L

, Mono % (Auto) 15.9 H, Eos % (Auto) 0.8 L, Baso % (Auto) 1.3, Gran # 2.39, 

Lymph # 0.7 L, Mono # 0.6, Eos # 0.0, Baso # 0.05


12/15/17 20:31: POC Glucose (mg/dL) 102








I have reviewed the lab results: Yes





- RAD Interpretation


Radiology Orders: 








12/15/17 20:21


CHEST PORTABLE [RAD] Stat 











: ED Physician





- EKG Interpretation


Interpreted by ED Physician: Yes


Type: 12 lead EKG





- Medication Orders


Current Medication Orders: 











Discontinued Medications





Sodium Chloride (Sodium Chloride 0.9%)  1,000 mls @ 999 mls/hr IV .Q1H1M STA


   Stop: 12/15/17 21:22


   Last Admin: 12/15/17 21:04  Dose: 999 mls/hr





eMAR Start Stop


 Document     12/15/17 21:04  SC  (Rec: 12/15/17 21:05  SC  [id number])


     Intravenous Solution


      Start Date                                 12/15/17


      Start Time                                 21:04


      End Date                                   12/15/17


      End time                                   22:05


      Total Infusion Time                        61





Lorazepam (Ativan)  1 mg IVP ONCE ONE


   PRN Reason: Protocol


   Stop: 12/15/17 20:23


   Last Admin: 12/15/17 21:04  Dose: 1 mg





IVP Administration


 Document     12/15/17 21:04  SC  (Rec: 12/15/17 21:04  SC  [id number])


     Charges for Administration


      # of IVP Administrations                   1





Ondansetron HCl (Zofran Inj)  4 mg IVP STAT STA


   Stop: 12/15/17 20:23


   Last Admin: 12/15/17 21:05  Dose: 4 mg





IVP Administration


 Document     12/15/17 21:05  SC  (Rec: 12/15/17 21:05  SC  [id number])


     Charges for Administration


      # of IVP Administrations                   1














- Scribe Statement


The provider has reviewed the documentation as recorded by the Scribe


Rome Diez. 





All medical record entries made by the Scribe were at my direction and 

personally dictated by me. I have reviewed the chart and agree that the record 

accurately reflects my personal performance of the history, physical exam, 

medical decision making, and the department course for this patient. I have 

also personally directed, reviewed, and agree with the discharge instructions 

and disposition.





Disposition/Present on Arrival





- Present on Arrival


History of DVT/PE: No


History of Uncontrolled Diabetes: No


Urinary Catheter: No


History of Decub. Ulcer: No


History Surgical Site Infection Following: None





- Disposition


Forms:  [facility] (English)

## 2017-12-16 VITALS
TEMPERATURE: 98.9 F | SYSTOLIC BLOOD PRESSURE: 130 MMHG | OXYGEN SATURATION: 98 % | DIASTOLIC BLOOD PRESSURE: 93 MMHG | HEART RATE: 103 BPM

## 2017-12-16 VITALS — RESPIRATION RATE: 20 BRPM

## 2017-12-16 LAB
ALBUMIN/GLOB SERPL: 1.6 {RATIO} (ref 1.1–1.8)
ALP SERPL-CCNC: 74 U/L (ref 38–126)
ALT SERPL-CCNC: 101 U/L (ref 7–56)
AMORPH SED URNS QL MICRO: (no result)
APPEARANCE UR: (no result)
AST SERPL-CCNC: 87 U/L (ref 17–59)
BACTERIA #/AREA URNS HPF: (no result) /[HPF]
BASOPHILS # BLD AUTO: 0.05 K/MM3 (ref 0–2)
BASOPHILS NFR BLD: 1.1 % (ref 0–3)
BILIRUB SERPL-MCNC: 2.5 MG/DL (ref 0.2–1.3)
BILIRUB UR-MCNC: NEGATIVE MG/DL
BUN SERPL-MCNC: 6 MG/DL (ref 7–21)
CALCIUM SERPL-MCNC: 9.5 MG/DL (ref 8.4–10.5)
CHLORIDE SERPL-SCNC: 104 MMOL/L (ref 98–107)
CO2 SERPL-SCNC: 21 MMOL/L (ref 21–33)
COLOR UR: (no result)
EOSINOPHIL # BLD: 0.1 10*3/UL (ref 0–0.7)
EOSINOPHIL NFR BLD: 2.2 % (ref 1.5–5)
ERYTHROCYTE [DISTWIDTH] IN BLOOD BY AUTOMATED COUNT: 13.3 % (ref 11.5–14.5)
GLOBULIN SER-MCNC: 2.6 GM/DL
GLUCOSE SERPL-MCNC: 75 MG/DL (ref 70–110)
GLUCOSE UR STRIP-MCNC: NEGATIVE MG/DL
GRANULOCYTES # BLD: 2.49 10*3/UL (ref 1.4–6.5)
GRANULOCYTES NFR BLD: 56 % (ref 50–68)
HCT VFR BLD CALC: 40.6 % (ref 42–52)
KETONES UR STRIP-MCNC: (no result) MG/DL
LEUKOCYTE ESTERASE UR-ACNC: (no result) LEU/UL
LYMPHOCYTES # BLD: 1.1 10*3/UL (ref 1.2–3.4)
LYMPHOCYTES NFR BLD AUTO: 25.4 % (ref 22–35)
MCH RBC QN AUTO: 34.2 PG (ref 25–35)
MCHC RBC AUTO-ENTMCNC: 34.2 G/DL (ref 31–37)
MCV RBC AUTO: 100 FL (ref 80–105)
MONOCYTES # BLD AUTO: 0.7 10*3/UL (ref 0.1–0.6)
MONOCYTES NFR BLD: 15.3 % (ref 1–6)
PH UR STRIP: 7.5 [PH] (ref 4.7–8)
PLATELET # BLD: 170 10^3/UL (ref 120–450)
PMV BLD AUTO: 8.7 FL (ref 7–11)
POTASSIUM SERPL-SCNC: 4 MMOL/L (ref 3.6–5)
PROT SERPL-MCNC: 7 G/DL (ref 5.8–8.3)
PROT UR STRIP-MCNC: NEGATIVE MG/DL
RBC # UR STRIP: (no result) /UL
RBC #/AREA URNS HPF: (no result) /HPF (ref 0–2)
SODIUM SERPL-SCNC: 138 MMOL/L (ref 132–148)
SP GR UR STRIP: 1.01 (ref 1–1.03)
UROBILINOGEN UR STRIP-ACNC: 1 E.U./DL
WBC # BLD AUTO: 4.5 10^3/UL (ref 4.5–11)
WBC #/AREA URNS HPF: (no result) /HPF (ref 0–6)

## 2017-12-16 NOTE — RAD
HISTORY:

abd pain  



COMPARISON:

09/03/2016 



FINDINGS:



LUNGS:

No active pulmonary disease.



PLEURA:

No significant pleural effusion identified, no pneumothorax apparent.



CARDIOVASCULAR:

Normal.



OSSEOUS STRUCTURES:

No significant abnormalities.



VISUALIZED UPPER ABDOMEN:

Normal.



OTHER FINDINGS:

None.



IMPRESSION:

No active disease.

## 2017-12-16 NOTE — CARD
--------------- APPROVED REPORT --------------





EKG Measurement

Heart [id number]

MT 126P50

[id number]

[id number]

[id number]



<Conclusion>

Sinus tachycardia

Possible Left atrial enlargement

Borderline ECG

## 2017-12-16 NOTE — CP.PCM.PN
<Slim Fuller - Last Filed: 12/16/17 14:39>





Subjective





- Date & Time of Evaluation


Date of Evaluation: 12/16/17


Time of Evaluation: 07:25





- Subjective


Subjective: 


Slim Fuller DO, PGY-1: Hospitalist Service


Patient seen and examined at bedside. Patient reports he drinks 9 beers a day 

and came in for nausea, vomiting. He complains of itching and does have a 

significant tremor at the time of my encounter. Nurse reports no events 

overnight.








Objective





- Vital Signs/Intake and Output


Vital Signs (last 24 hours): 


 











Temp Pulse Resp BP Pulse Ox


 


 98.5 F   78   20   128/84   97 


 


 12/16/17 08:01  12/16/17 08:01  12/16/17 08:01  12/16/17 08:01  12/16/17 08:01








Intake and Output: 


 











 12/16/17 12/16/17





 06:59 18:59


 


Intake Total  0


 


Output Total  350


 


Balance  -350














- Medications


Medications: 


 Current Medications





Folic Acid (Folic Acid)  1 mg PO DAILY [facility]


Lorazepam (Ativan)  2 mg PO Q6H JOVANA


   PRN Reason: Protocol


Lorazepam (Ativan)  2 mg IVP Q6H PRN; Protocol


   PRN Reason: Symptoms of alcohol withdrawl


Multivitamins (Thera Tab)  1 tab PO 0800 [facility]


Ondansetron HCl (Zofran Inj)  4 mg IVP Q6 PRN


   PRN Reason: Nausea/Vomiting


   Last Admin: 12/16/17 06:51 Dose:  4 mg


Thiamine HCl (Vitamin B1 Tab)  100 mg PO DAILY JOVANA











- Labs


Labs: 


 





 12/16/17 06:00 





 12/16/17 06:00 





 











PT  11.6 SECONDS (9.4-12.5)   12/15/17  21:00    


 


INR  1.06  (0.93-1.08)   12/15/17  21:00    


 


APTT  29.3 Seconds (25.1-36.5)   12/15/17  21:00    














- Constitutional


Appears: Non-toxic, Other (anxious)





- Head Exam


Head Exam: ATRAUMATIC, NORMOCEPHALIC





- Eye Exam


Eye Exam: EOMI, Normal appearance





- ENT Exam


ENT Exam: Mucous Membranes Moist, Normal Oropharynx





- Neck Exam


Neck Exam: Normal Inspection





- Respiratory Exam


Respiratory Exam: Clear to Ausculation Bilateral, NORMAL BREATHING PATTERN





- Cardiovascular Exam


Cardiovascular Exam: RRR, +S1, +S2





- GI/Abdominal Exam


GI & Abdominal Exam: Soft, Normal Bowel Sounds





- Extremities Exam


Extremities Exam: Normal Inspection





- Neurological Exam


Neurological Exam: Alert, CN II-XII Intact, Oriented x3


Additional comments: 


tremulous





- Psychiatric Exam


Psychiatric exam: Anxious





- Skin


Skin Exam: Dry, Intact, Normal Color, Warm





Assessment and Plan





- Assessment and Plan (Free Text)


Assessment: 


37 year old male with a past medical history of hypertension and alcohol abuse 

who is being admitted for alcohol withdrawal.








1. Alcohol withdrawal  


- Ativan 2 mg q6h PO JOVANA


- Ativan 2 mg q3h IVP PRN 


- Multivitamin, Folic acid and Thiamine.


-Seizures precautions. 


-Fall protocols


-CIWA protocols.





2. Nausea, vomiting


-Zofran


- Regular diet





3. Transaminitis


- Continue to monitor





4. Hypertension


-Restart home medications.





5) UTI


- Bactrim DS BID for





6. GI ppx


-Pepcid 20 mg BID





7. DVT PPX


-SCD's








<Lizbeth Villanueva - Last Filed: 12/16/17 18:39>





Objective





- Vital Signs/Intake and Output


Vital Signs (last 24 hours): 


 











Temp Pulse Resp BP Pulse Ox


 


 98.9 F   103 H  20   130/93 H  98 


 


 12/16/17 16:00  12/16/17 16:00  12/16/17 16:00  12/16/17 16:00  12/16/17 16:00








Intake and Output: 


 











 12/16/17 12/16/17





 06:59 18:59


 


Intake Total  780


 


Output Total  350


 


Balance  430














- Labs


Labs: 


 





 12/16/17 06:00 





 12/16/17 06:00 





 











PT  11.6 SECONDS (9.4-12.5)   12/15/17  21:00    


 


INR  1.06  (0.93-1.08)   12/15/17  21:00    


 


APTT  29.3 Seconds (25.1-36.5)   12/15/17  21:00    














Attending/Attestation





- Attestation


I have personally seen and examined this patient.: Yes


I have fully participated in the care of the patient.: Yes


I have reviewed all pertinent clinical information, including history, physical 

exam and plan: Yes


Notes (Text): 





12/16/17 18:38


Patient was seen and examined with medical resident.Agreed with management plan.


The issue of ongoing alcohol abuse was discussed with the patient.


Education was provided.

## 2017-12-19 NOTE — CP.PCM.DIS
Provider





- Provider


Date of Admission: 


12/15/17 21:50





Attending physician: 


Obdulio Irene MD





Time Spent in preparation of Discharge (in minutes): 38





Hospital Course





- Lab Results


Lab Results: 


 Most Recent Lab Values











WBC  4.5 10^3/ul (4.5-11.0)  D 12/16/17  06:00    


 


RBC  4.06 10^6/uL (3.5-6.1)   12/16/17  06:00    


 


Hgb  13.9 g/dL (14.0-18.0)  L  12/16/17  06:00    


 


Hct  40.6 % (42.0-52.0)  L  12/16/17  06:00    


 


MCV  100.0 fl (80.0-105.0)   12/16/17  06:00    


 


MCH  34.2 pg (25.0-35.0)   12/16/17  06:00    


 


MCHC  34.2 g/dl (31.0-37.0)   12/16/17  06:00    


 


RDW  13.3 % (11.5-14.5)   12/16/17  06:00    


 


Plt Count  170 10^3/uL (120.0-450.0)   12/16/17  06:00    


 


MPV  8.7 fl (7.0-11.0)   12/16/17  06:00    


 


Gran %  56.0 % (50.0-68.0)   12/16/17  06:00    


 


Lymph % (Auto)  25.4 % (22.0-35.0)   12/16/17  06:00    


 


Mono % (Auto)  15.3 % (1.0-6.0)  H  12/16/17  06:00    


 


Eos % (Auto)  2.2 % (1.5-5.0)   12/16/17  06:00    


 


Baso % (Auto)  1.1 % (0.0-3.0)   12/16/17  06:00    


 


Gran #  2.49  (1.4-6.5)   12/16/17  06:00    


 


Lymph #  1.1  (1.2-3.4)  L  12/16/17  06:00    


 


Mono #  0.7  (0.1-0.6)  H  12/16/17  06:00    


 


Eos #  0.1  (0.0-0.7)   12/16/17  06:00    


 


Baso #  0.05 K/mm3 (0.0-2.0)   12/16/17  06:00    


 


PT  11.6 SECONDS (9.4-12.5)   12/15/17  21:00    


 


INR  1.06  (0.93-1.08)   12/15/17  21:00    


 


APTT  29.3 Seconds (25.1-36.5)   12/15/17  21:00    


 


Sodium  138 mmol/L (132-148)   12/16/17  06:00    


 


Potassium  4.0 mmol/L (3.6-5.0)   12/16/17  06:00    


 


Chloride  104 mmol/L ()   12/16/17  06:00    


 


Carbon Dioxide  21 mmol/L (21-33)   12/16/17  06:00    


 


Anion Gap  17  (10-20)   12/16/17  06:00    


 


BUN  6 mg/dL (7-21)  L  12/16/17  06:00    


 


Creatinine  0.7 mg/dl (0.8-1.5)  L  12/16/17  06:00    


 


Est GFR ( Amer)  > 60   12/16/17  06:00    


 


Est GFR (Non-Af Amer)  > 60   12/16/17  06:00    


 


POC Glucose (mg/dL)  102 mg/dL ()   12/15/17  20:31    


 


Random Glucose  75 mg/dL ()   12/16/17  06:00    


 


Calcium  9.5 mg/dL (8.4-10.5)   12/16/17  06:00    


 


Magnesium  1.7 mg/dL (1.7-2.2)   12/16/17  06:00    


 


Total Bilirubin  2.5 mg/dL (0.2-1.3)  H  12/16/17  06:00    


 


AST  87 U/L (17-59)  H D 12/16/17  06:00    


 


ALT  101 U/L (7-56)  H  12/16/17  06:00    


 


Alkaline Phosphatase  74 U/L ()   12/16/17  06:00    


 


Lactate Dehydrogenase  581 U/L (333-699)   12/15/17  21:00    


 


Total Creatine Kinase  200 U/L ()   12/15/17  21:00    


 


Troponin I  < 0.01 ng/mL  12/15/17  21:00    


 


Total Protein  7.0 g/dL (5.8-8.3)   12/16/17  06:00    


 


Albumin  4.3 g/dL (3.0-4.8)   12/16/17  06:00    


 


Globulin  2.6 gm/dL  12/16/17  06:00    


 


Albumin/Globulin Ratio  1.6  (1.1-1.8)   12/16/17  06:00    


 


Amylase  97 U/L ()   12/15/17  21:00    


 


Lipase  278 U/L ()   12/15/17  21:00    


 


Urine Color  Light yellow  (YELLOW)   12/16/17  08:52    


 


Urine Appearance  Turbid  (CLEAR)   12/16/17  08:52    


 


Urine pH  7.5  (4.7-8.0)   12/16/17  08:52    


 


Ur Specific Gravity  1.015  (1.005-1.035)   12/16/17  08:52    


 


Urine Protein  Negative mg/dL (<30 mg/dL)   12/16/17  08:52    


 


Urine Glucose (UA)  Negative mg/dL (NEGATIVE)   12/16/17  08:52    


 


Urine Ketones  Trace mg/dL (NEGATIVE)  H  12/16/17  08:52    


 


Urine Blood  Trace-intact  (NEGATIVE)  H  12/16/17  08:52    


 


Urine Nitrate  Positive  (NEGATIVE)  H  12/16/17  08:52    


 


Urine Bilirubin  Negative  (NEGATIVE)   12/16/17  08:52    


 


Urine Urobilinogen  1.0 E.U./dL (<1 E.U./dL)  H  12/16/17  08:52    


 


Ur Leukocyte Esterase  Large Ginny/uL (NEGATIVE)  H  12/16/17  08:52    


 


Urine RBC  0 - 2 /hpf (0-2)   12/16/17  08:52    


 


Urine WBC  Tntc /hpf (0-6)   12/16/17  08:52    


 


Amorphous Sediment  Few   12/16/17  08:52    


 


Urine Bacteria  Small  (NEG)   12/16/17  08:52    


 


Urine Opiates Screen  Negative  (NEGATIVE)   12/16/17  08:58    


 


Urine Methadone Screen  Negative  (NEGATIVE)   12/16/17  08:58    


 


Ur Barbiturates Screen  Negative  (NEGATIVE)   12/16/17  08:58    


 


Ur Phencyclidine Scrn  Negative  (NEGATIVE)   12/16/17  08:58    


 


Ur Amphetamines Screen  Negative  (NEGATIVE)   12/16/17  08:58    


 


U Benzodiazepines Scrn  Negative  (NEGATIVE)   12/16/17  08:58    


 


U Oth Cocaine Metabols  Negative  (NEGATIVE)   12/16/17  08:58    


 


U Cannabinoids Screen  Negative  (NEGATIVE)   12/16/17  08:58    


 


Alcohol, Quantitative  < 10 mg/dL (0-10)   12/15/17  21:00    














- Hospital Course


Hospital Course: 


37 year old male with a past medical history of alcohol abuse and hypertension 

who [place] [facility] [place]ractable [place], vomiting, and concerns for alcohol 

withdrawal. Initial diagnostic tests revealed an elevations of LFTs and UTI 

while physical exam demonstrated that the patient was exhibiting signs of 

alcohol withdrawal. He was started on scheduled Ativan, Multivitamin infusion, 

CIWA protocol, fall precautions, and seizure precautions. He was monitored 

overnight and his diet was advanced; he was switched to scheduled PO Ativan. 

Sometime during day #2 of his hospitalization, he asked to nurse if he could 

leave. The on call resident spoke to the patient and addressed the risks of 

signing out AMA, including but not limited to, increased morbidity/mortality, 

death, paralysis, and seizures. The patient wwas alert and oriented and 

demonstrated understanding to risks of leaving AMA. 








- Date & Time of H&P


Date of H&P: 12/16/17


Time of H&P: 15:20





Discharge Exam





- Head Exam


Head Exam: ATRAUMATIC, NORMOCEPHALIC





Discharge Plan





- Follow Up Plan


Condition: GOOD


Disposition: AGAINST MEDICAL ADVICE


Additional Instructions: 


1) Patient informed that leaving AMA could result in seizures, death, paralysis

, and increase in morbidity and mortality.

## 2018-01-12 ENCOUNTER — HOSPITAL ENCOUNTER (INPATIENT)
Dept: HOSPITAL 42 - ED | Age: 38
LOS: 4 days | Discharge: HOME | DRG: 68 | End: 2018-01-16
Attending: INTERNAL MEDICINE | Admitting: INTERNAL MEDICINE
Payer: MEDICAID

## 2018-01-12 VITALS — BODY MASS INDEX: 24.3 KG/M2

## 2018-01-12 DIAGNOSIS — Z91.19: ICD-10-CM

## 2018-01-12 DIAGNOSIS — F10.280: ICD-10-CM

## 2018-01-12 DIAGNOSIS — F10.231: ICD-10-CM

## 2018-01-12 DIAGNOSIS — F32.9: ICD-10-CM

## 2018-01-12 DIAGNOSIS — Z87.891: ICD-10-CM

## 2018-01-12 DIAGNOSIS — I10: ICD-10-CM

## 2018-01-12 DIAGNOSIS — Z78.1: ICD-10-CM
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
## 2018-01-12 DIAGNOSIS — J11.1: Primary | ICD-10-CM

## 2018-01-12 LAB
ALBUMIN SERPL-MCNC: 4.9 G/DL (ref 3–4.8)
ALBUMIN/GLOB SERPL: 1.4 {RATIO} (ref 1.1–1.8)
ALT SERPL-CCNC: 146 U/L (ref 7–56)
APPEARANCE UR: CLEAR
AST SERPL-CCNC: 173 U/L (ref 17–59)
BASOPHILS # BLD AUTO: 0.02 K/MM3 (ref 0–2)
BASOPHILS NFR BLD: 0.5 % (ref 0–3)
BILIRUB UR-MCNC: NEGATIVE MG/DL
BUN SERPL-MCNC: 6 MG/DL (ref 7–21)
CALCIUM SERPL-MCNC: 10.1 MG/DL (ref 8.4–10.5)
COLOR UR: YELLOW
EOSINOPHIL # BLD: 0.2 10*3/UL (ref 0–0.7)
EOSINOPHIL NFR BLD: 4.3 % (ref 1.5–5)
ERYTHROCYTE [DISTWIDTH] IN BLOOD BY AUTOMATED COUNT: 12.8 % (ref 11.5–14.5)
GFR NON-AFRICAN AMERICAN: > 60
GLUCOSE UR STRIP-MCNC: NEGATIVE MG/DL
GRANULOCYTES # BLD: 2.41 10*3/UL (ref 1.4–6.5)
GRANULOCYTES NFR BLD: 57.2 % (ref 50–68)
HEPATITIS A IGM: NEGATIVE
HEPATITIS B SURFACE AG: NEGATIVE
HGB BLD-MCNC: 15.4 G/DL (ref 14–18)
LEUKOCYTE ESTERASE UR-ACNC: NEGATIVE LEU/UL
LYMPHOCYTES # BLD: 0.8 10*3/UL (ref 1.2–3.4)
LYMPHOCYTES NFR BLD AUTO: 18.8 % (ref 22–35)
MAGNESIUM SERPL-MCNC: 1.9 MG/DL (ref 1.7–2.2)
MCH RBC QN AUTO: 35.9 PG (ref 25–35)
MCHC RBC AUTO-ENTMCNC: 35.5 G/DL (ref 31–37)
MCV RBC AUTO: 101.2 FL (ref 80–105)
MONOCYTES # BLD AUTO: 0.8 10*3/UL (ref 0.1–0.6)
MONOCYTES NFR BLD: 19.2 % (ref 1–6)
PH UR STRIP: 6.5 [PH] (ref 4.7–8)
PLATELET # BLD: 151 10^3/UL (ref 120–450)
PMV BLD AUTO: 9.3 FL (ref 7–11)
PROT UR STRIP-MCNC: NEGATIVE MG/DL
RBC # BLD AUTO: 4.29 10^6/UL (ref 3.5–6.1)
RBC # UR STRIP: NEGATIVE /UL
SP GR UR STRIP: 1.01 (ref 1–1.03)
URINE NITRATE: NEGATIVE
UROBILINOGEN UR STRIP-ACNC: 0.2 E.U./DL
WBC # BLD AUTO: 4.2 10^3/UL (ref 4.5–11)

## 2018-01-12 RX ADMIN — PANTOPRAZOLE SODIUM SCH MG: 40 TABLET, DELAYED RELEASE ORAL at 05:46

## 2018-01-12 NOTE — CP.PCM.HP
<Arvind Fragoso - Last Filed: 01/12/18 06:10>





History of Present Illness





- History of Present Illness


History of Present Illness: 


Mr. Mead is a 37 year old male with a past medical history significant for HTN 

and alcohol abuse/withdrawal who presents with non-productive cough, chills, 

and body aches for approximately the past 36 hours. Patient reports that he was 

at home when his symptoms started and denies ever having them in the past. He 

reports that he took his temperature at home and his Tmax was 99. He denies any 

sick contacts or any alleviating/aggravating factors. He also endorses that he 

is a chronic alcoholic and his last drink was also approximately 36 hours ago. 

He endorses that he has untreated anxiety and requested that a psychiatrist 

come and speak with him regarding this issue. He denies denies suicidal or 

homicidal ideation, depression, headache, changes in his vision, ear pain/

discharge, sore throat, dysphagia, chest pain, palpitations, leg swelling, 

orthopnea, SOB, wheezing, hemoptysis, abdominal pain, N/V, diarrhea, 

constipation, melena, hematochezia, burning/pain with urination, urinary 

frequency, hematuria, skin changes, joint pain, or any numbness/tingling/

weakness of any extremity. 





PMH: HTN and alcohol abuse/withdrawal


PSH: Denies


Family History: Denies


Social History: Former smoker (quit ~one year ago), endorses daily alcohol 

consumption (8 to 9 24 ounce beers/day) and denies illicit drug use; Lives with 

girlfriend; Unemployed


Allergies: Denies


Home Medications: Lisinopril 10mg PO Daily





PMD: Dr. Mead











Present on Admission





- Present on Admission


Any Indicators Present on Admission: No





Review of Systems





- Review of Systems


Review of Systems: 


As stated in HPI, otherwise negative





Past Patient History





- Infectious Disease


Hx of Infectious Diseases: None





- Tetanus Immunizations


Tetanus Immunization: Unknown





- Past Social History


Smoking Status: Former Smoker





- CARDIAC


Hx Cardiac Disorders: Yes (Palpitations)


Hx Hypertension: Yes





- PULMONARY


Hx Respiratory Disorders: Yes (pharygitis)


Other/Comment: PHARYNGITIS





- NEUROLOGICAL


Hx Neurological Disorder: No





- HEENT


Hx HEENT Problems: No





- RENAL


Hx Chronic Kidney Disease: No





- ENDOCRINE/METABOLIC


Hx Endocrine Disorders: No





- HEMATOLOGICAL/ONCOLOGICAL


Hx Blood Disorders: No





- INTEGUMENTARY


Hx Dermatological Problems: No





- MUSCULOSKELETAL/RHEUMATOLOGICAL


Hx Falls: Yes


Hx Unsteady Gait: Yes





- GASTROINTESTINAL


Hx Gastrointestinal Disorders: No





- GENITOURINARY/GYNECOLOGICAL


Hx Genitourinary Disorders: No





- PSYCHIATRIC


Hx Anxiety: Yes


Hx Depression: Yes


Hx Hallucinations: Yes


Hx Substance Use: Yes (ETOH)


Other/Comment: ETOH ABUSE 10 BEERS A DAY





- SURGICAL HISTORY


Hx Surgeries: No





- ANESTHESIA


Hx Anesthesia: No


Hx Anesthesia Reactions: No


Hx Malignant Hyperthermia: No





Meds


Allergies/Adverse Reactions: 


 Allergies











Allergy/AdvReac Type Severity Reaction Status Date / Time


 


No Known Allergies Allergy   Verified 12/15/17 20:16














Physical Exam





- Constitutional


Appears: Non-toxic, No Acute Distress





- Head Exam


Head Exam: ATRAUMATIC, NORMAL INSPECTION, NORMOCEPHALIC





- Eye Exam


Eye Exam: EOMI, Normal appearance, PERRL.  absent: Conjunctival injection, 

Nystagmus, Periorbital swelling, Periorbital tenderness, Scleral icterus


Pupil Exam: NORMAL ACCOMODATION, PERRL.  absent: Fixed, Irregular, Miosis, 

Mydriatic, Unequal





- ENT Exam


ENT Exam: Mucous Membranes Dry, Normal Exam, Normal External Ear Exam, Normal 

Oropharynx.  absent: Mucous Membranes Moist





- Neck Exam


Neck exam: Positive for: Full Rom, Normal Inspection.  Negative for: 

Lymphadenopathy, Meningismus, Tenderness, Thyromegaly





- Respiratory Exam


Respiratory Exam: Clear to Auscultation Bilateral, NORMAL BREATHING PATTERN.  

absent: Accessory Muscle Use, Chest Wall Tenderness, Decreased Breath Sounds, 

Prolonged Expiratory Phase, Rales, Rhonchi, Wheezes, Respiratory Distress, 

Stridor





- Cardiovascular Exam


Cardiovascular Exam: Tachycardia, REGULAR RHYTHM, +S1, +S2.  absent: Bradycardia

, Clicks, Diastolic murmur, Gallop, Irregular Rhythm, JVD, RRR, Rubs, +S4, 

Systolic Murmur





- GI/Abdominal Exam


GI & Abdominal Exam: Normal Bowel Sounds, Soft.  absent: Bruit, Diminished 

Bowel Sounds, Distended, Firm, Guarding, Hernia, Hyperactive Bowel Sounds, 

Hypoactive Bowel Sounds, Mass, Organomegaly, Pulsatile Mass, Rebound, Rigid, 

Tenderness





- Extremities Exam


Extremities exam: Positive for: full ROM, normal capillary refill, normal 

inspection, pedal pulses present.  Negative for: calf tenderness, joint swelling

, pedal edema, tenderness





- Back Exam


Back exam: FULL ROM, NORMAL INSPECTION.  absent: CVA tenderness (L), CVA 

tenderness (R), muscle spasm, paraspinal tenderness, rash noted, tenderness, 

vertebral tenderness





- Neurological Exam


Neurological exam: Alert, CN II-XII Intact, Normal Gait, Oriented x3


Additional comments: 


Patient noted to be tremulous on exam





- Psychiatric Exam


Psychiatric exam: Normal Affect, Normal Mood





- Skin


Skin Exam: Dry, Intact, Normal Color, Warm





Results





- Vital Signs


Recent Vital Signs: 





 Last Vital Signs











Temp  99 F   01/12/18 02:30


 


Pulse  94 H  01/12/18 04:00


 


Resp  16   01/12/18 04:00


 


BP  139/93 H  01/12/18 04:00


 


Pulse Ox  97   01/12/18 04:00














- Labs


Result Diagrams: 


 01/12/18 03:10





 01/12/18 03:10





- EKG Data


EKG shows normal: Sinus rhythm


Rate: Tachycardia (101)





Assessment & Plan





- Assessment and Plan (Free Text)


Assessment: 


37 year old male with a past medical history significant for HTN and alcohol 

abuse/withdrawal who presents with non-productive cough, chills, and body aches 

for approximately the past 36 hours. In the ED, patient tested positive for 

influenza and was given one dose of Tamiflu. A chest x-ray is pending 

radiologist interpretation but was read as no active disease by ED physician. 

An EKG was done and showed sinus tachycardia at 101. No significant changes 

from patients baseline were noted on a CBC but patient was found to have 

transaminitis with elevated AST, ALT and T. Bili. He was also noted to be in 

alcohol withdrawal and Ativan PRN with Q4H alcohol withdrawal assessments were 

ordered. 


Plan: 


1. Influenza


-Tested positive for influenza


-Received one dose of Tamiflu in ED


-Tamiflu 75mg PO BID for five days








2. Alcohol Abuse/Withdrawal


-Last drink reported to be approximately 36 hours PTA


-Alcohol level <10


-UDS pending


-Ativan 1mg IVP Q6H PRN


-Zofran 4mg IVP Q6H PRN


-Banana bag at 100mls/hr


-Alcohol Withdrawal Assessments Q4H


-Fall, aspiration and seizure precautions in place





3. Transaminitis


-Hepatitis panel pending





4. History of HTN


-Continue home Lisinopril


-Heart Healthy Diet





5. Anxiety


-Psych consult





GI Prophylaxis: Protonix


DVT Prophylaxis: SCD's





Patient seen and case discussed with attending, Dr. Cuco You.





- Date & Time


Date: 01/12/18


Time: 05:33





Decision To Admit





- Pt Status Changed To:


Hospital Disposition Of: Inpatient Admission





- Admit Certification


Admit to Inpatient:: After my assessment, the patient will require 

hospitalization for at least two midnights.  This is because of the severity of 

symptoms shown, intensity of services needed, and/or the medical risk in this 

patient being treated as an outpatient.





- .


Bed Request Type: Telemetry





<Obdulio Irene - Last Filed: 01/13/18 14:55>





Results





- Vital Signs


Recent Vital Signs: 





 Last Vital Signs











Temp  98.9 F   01/13/18 12:00


 


Pulse  126 H  01/13/18 12:00


 


Resp  20   01/13/18 12:00


 


BP  156/105 H  01/13/18 12:00


 


Pulse Ox  98   01/13/18 06:00














- Labs


Result Diagrams: 


 01/13/18 06:30





 01/13/18 06:15


Labs: 





 Laboratory Results - last 24 hr











  01/13/18 01/13/18





  06:15 06:30


 


WBC   5.2  D


 


RBC   4.15


 


Hgb   14.5


 


Hct   42.3


 


MCV   101.9


 


MCH   34.9


 


MCHC   34.3


 


RDW   12.8


 


Plt Count   155


 


MPV   9.3


 


Gran %   54.2


 


Lymph % (Auto)   22.7


 


Mono % (Auto)   19.2 H


 


Eos % (Auto)   2.9


 


Baso % (Auto)   1.0


 


Gran #   2.82


 


Lymph #   1.2


 


Mono #   1.0 H


 


Eos #   0.2


 


Baso #   0.05


 


Sodium  139 


 


Potassium  3.8 


 


Chloride  104 


 


Carbon Dioxide  20 L 


 


Anion Gap  19 


 


BUN  9 


 


Creatinine  0.8 


 


Est GFR ( Amer)  > 60 


 


Est GFR (Non-Af Amer)  > 60 


 


Random Glucose  96 


 


Calcium  10.3 


 


Total Bilirubin  1.4 H 


 


AST  120 H D 


 


ALT  127 H 


 


Alkaline Phosphatase  72 


 


Total Protein  7.5 


 


Albumin  4.6 


 


Globulin  2.9 


 


Albumin/Globulin Ratio  1.6 














Attending/Attestation





- Attestation


I have personally seen and examined this patient.: Yes


I have fully participated in the care of the patient.: Yes


I have reviewed all pertinent clinical information: Yes


Notes (Text): 





01/13/18 14:53





Attending note;





Patient seen and examined with resident in ER.





Patient is a 37-year-old  male well-known to our service secondary to 

chronic alcohol abuse.


Currently getting admitted for alcohol abuse and influenza. Continue Tamiflu. 

On respiratory isolation.





Alcohol abuse; patient goes into alcohol withdrawal with severe confusion. 

Monitor closely.


Continue seizure protocol. Continue Ativan.


Continue IV banana bag.





Monitor closely.





Upon discharge patient [place] [facility]

## 2018-01-12 NOTE — CARD
--------------- APPROVED REPORT --------------





EKG Measurement

Heart [id number]

[id number]

[id number]

[id number]

[id number]



<Conclusion>

Sinus tachycardia

Otherwise normal ECG

## 2018-01-12 NOTE — RAD
HISTORY:

cough  



COMPARISON:

12/15/2017 



FINDINGS:



LUNGS:

No active pulmonary disease.



PLEURA:

No significant pleural effusion identified, no pneumothorax apparent.



CARDIOVASCULAR:

Normal.



OSSEOUS STRUCTURES:

No significant abnormalities.



VISUALIZED UPPER ABDOMEN:

Normal.



OTHER FINDINGS:

None.



IMPRESSION:

No active disease.

## 2018-01-12 NOTE — ED PDOC
Arrival/HPI





- General


Chief Complaint: Flu-like Symptoms


Time Seen by Provider: 01/12/18 02:28





- History of Present Illness


Narrative History of Present Illness (Text): 


38 y/o M c PMHx alcoholism, HTN p/w cough, chills, body aches since last night. 

Last alcoholic drink yesterday afternoon. Similar to previous visits for 

alcohol withdrawal except for cough and sneezing that is present now. Denies 

fever, vomiting, dyspnea, dysuria.





Past Medical History





- Infectious Disease


Hx of Infectious Diseases: None





- Tetanus Immunization


Tetanus Immunization: Unknown





- Cardiac


Hx Cardiac Disorders: Yes (Palpitations)


Hx Hypertension: Yes





- Pulmonary


Hx Respiratory Disorders: Yes (pharygitis)


Other/Comment: PHARYNGITIS





- Neurological


Hx Neurological Disorder: No





- HEENT


Hx HEENT Disorder: No





- Renal


Hx Renal Disorder: No





- Endocrine/Metabolic


Hx Endocrine Disorders: No





- Hematological/Oncological


Hx Blood Disorders: No





- Integumentary


Hx Dermatological Disorder: No





- Musculoskeletal/Rheumatological


Hx Falls: Yes


Hx Unsteady Gait: Yes





- Gastrointestinal


Hx Gastrointestinal Disorders: No





- Genitourinary/Gynecological


Hx Genitourinary Disorders: No





- Psychiatric


Hx Anxiety: Yes


Hx Depression: Yes


Hx Hallucinations: Yes


Hx Substance Use: Yes (ETOH)


Other/Comment: ETOH ABUSE 10 BEERS A DAY





- Past Surgical History


Past Surgical History: No Previous





- Anesthesia


Hx Anesthesia: No


Hx Anesthesia Reactions: No


Hx Malignant Hyperthermia: No





- Suicidal Assessment


Feels Threatened In Home Enviroment: No





Family/Social History


Family/Social History: No Known Family HX


Smoking Status: Former Smoker


Hx Alcohol Use: Yes


Frequency of alcohol use: Daily


Hx Substance Use: Yes (ETOH)


Hx Substance Use Treatment: No





Allergies/Home Meds


Allergies/Adverse Reactions: 


Allergies





No Known Allergies Allergy (Verified 12/15/17 20:16)


 








Home Medications: 


 Home Meds











 Medication  Instructions  Recorded  Confirmed


 


No Known Home Med  12/15/17 12/15/17














Review of Systems





- Physician Review


All systems were reviewed & negative as marked: Yes





- Review of Systems


Constitutional: absent: Fevers


Cardiovascular: absent: Chest Pain





Physical Exam





- Physical Exam


Narrative Physical Exam (Text): 


Gen: NAD, scratching frequently


Head: NC/AT


Eyes: PERRL


ENT: MMM. +tongue fasciculations.


Neck: Supple


CV: Tachycardic


Lungs: CTA b/l


Abd: Soft, NT


Ext: Fine tremor with hands extended


Neuro: No focal deficit





Vital Signs











  Temp Pulse Resp BP Pulse Ox


 


 01/12/18 04:00   94 H  16  139/93 H  97


 


 01/12/18 02:30  99 F  104 H  18  152/113 H  100














Medical Decision Making


ED Course and Treatment: 








Plan:


-- EKG 


-- Labs


-- Chest X-ray 


-- Ativan 


-- IV Fluids


-- Vitamin B1 Inj


-- Influenza A B 


-- Urinalysis 


-- Reassess and disposition





Progress Notes:





01/12/18 03:19


CXR Impression: As read by me, NAD





EKG shows Sinus Rhythm at 101 BPM with no ST/T wave changes. Interpreted by me. 


 





- Lab Interpretations


Lab Results: 








 01/12/18 03:10 





 01/12/18 03:10 





 Lab Results





01/12/18 03:10: Alcohol, Quantitative < 10


01/12/18 03:10: Sodium 137, Potassium 4.4, Chloride 101, Carbon Dioxide 25, 

Anion Gap 16, BUN 6 L, Creatinine 0.7 L, Est GFR ( Amer) > 60, Est GFR (

Non-Af Amer) > 60, Random Glucose 111 H, Calcium 10.1, Total Bilirubin 1.6 H, 

 H D,  H, Alkaline Phosphatase 124, Total Protein 8.5 H, Albumin 

4.9 H, Globulin 3.5, Albumin/Globulin Ratio 1.4


01/12/18 03:10: Influenza Typ A,B (EIA) Pos for influenza a H


01/12/18 03:10: WBC 4.2 L, RBC 4.29, Hgb 15.4, Hct 43.4, .2, MCH 35.9 H, 

MCHC 35.5, RDW 12.8, Plt Count 151, MPV 9.3, Gran % 57.2, Lymph % (Auto) 18.8 L

, Mono % (Auto) 19.2 H, Eos % (Auto) 4.3, Baso % (Auto) 0.5, Gran # 2.41, Lymph 

# 0.8 L, Mono # 0.8 H, Eos # 0.2, Baso # 0.02











- RAD Interpretation


Radiology Orders: 








01/12/18 02:50


CHEST PORTABLE [RAD] Stat 














- Medication Orders


Current Medication Orders: 











Discontinued Medications





Multivitamins/Vitamin C 10 ml/Thiamine HCl 100 mg/ Folic Acid 1 mg/ Sodium 

Chloride  1,011.2 mls @ 1,000 mls/hr IV .Q1H1M ONE


   Stop: 01/12/18 03:51


   Last Admin: 01/12/18 03:13  Dose: 1,000 mls/hr





eMAR Start Stop


 Document     01/12/18 03:13  YP  (Rec: 01/12/18 03:13  YP  [facility])


     Intravenous Solution


      Start Date                                 01/12/18


      Start Time                                 03:13


      End Date                                   01/12/18


      End time                                   04:13


      Total Infusion Time                        60





Lorazepam (Ativan)  1 mg IVP ONCE ONE


   PRN Reason: Protocol


   Stop: 01/12/18 02:50


   Last Admin: 01/12/18 03:13  Dose: 1 mg





IVP Administration


 Document     01/12/18 03:13  YP  (Rec: 01/12/18 03:13  YP  [facility])


     Charges for Administration


      # of IVP Administrations                   1





Oseltamivir Phosphate (Tamiflu Cap)  75 mg PO STAT STA


   PRN Reason: Protocol


   Stop: 01/12/18 05:00











Disposition/Present on Arrival





- Present on Arrival


Any Indicators Present on Arrival: No


History of DVT/PE: No


History of Uncontrolled Diabetes: No


Urinary Catheter: No


History of Decub. Ulcer: No


History Surgical Site Infection Following: None





- Disposition


Have Diagnosis and Disposition been Completed?: Yes


Diagnosis: 


 Alcohol withdrawal, Influenza





Disposition: HOSPITALIZED


Disposition Time: 05:06


Patient Plan: Admission, Telemetry


Condition: GUARDED


Forms:  [facility] (English)

## 2018-01-13 LAB
ALBUMIN SERPL-MCNC: 4.6 G/DL (ref 3–4.8)
ALBUMIN/GLOB SERPL: 1.6 {RATIO} (ref 1.1–1.8)
ALT SERPL-CCNC: 127 U/L (ref 7–56)
AST SERPL-CCNC: 120 U/L (ref 17–59)
BASOPHILS # BLD AUTO: 0.05 K/MM3 (ref 0–2)
BASOPHILS NFR BLD: 1 % (ref 0–3)
BUN SERPL-MCNC: 9 MG/DL (ref 7–21)
CALCIUM SERPL-MCNC: 10.3 MG/DL (ref 8.4–10.5)
EOSINOPHIL # BLD: 0.2 10*3/UL (ref 0–0.7)
EOSINOPHIL NFR BLD: 2.9 % (ref 1.5–5)
ERYTHROCYTE [DISTWIDTH] IN BLOOD BY AUTOMATED COUNT: 12.8 % (ref 11.5–14.5)
GFR NON-AFRICAN AMERICAN: > 60
GRANULOCYTES # BLD: 2.82 10*3/UL (ref 1.4–6.5)
GRANULOCYTES NFR BLD: 54.2 % (ref 50–68)
HGB BLD-MCNC: 14.5 G/DL (ref 14–18)
LYMPHOCYTES # BLD: 1.2 10*3/UL (ref 1.2–3.4)
LYMPHOCYTES NFR BLD AUTO: 22.7 % (ref 22–35)
MCH RBC QN AUTO: 34.9 PG (ref 25–35)
MCHC RBC AUTO-ENTMCNC: 34.3 G/DL (ref 31–37)
MCV RBC AUTO: 101.9 FL (ref 80–105)
MONOCYTES # BLD AUTO: 1 10*3/UL (ref 0.1–0.6)
MONOCYTES NFR BLD: 19.2 % (ref 1–6)
PLATELET # BLD: 155 10^3/UL (ref 120–450)
PMV BLD AUTO: 9.3 FL (ref 7–11)
RBC # BLD AUTO: 4.15 10^6/UL (ref 3.5–6.1)
WBC # BLD AUTO: 5.2 10^3/UL (ref 4.5–11)

## 2018-01-13 RX ADMIN — THERA TABS SCH TAB: TAB at 09:46

## 2018-01-13 RX ADMIN — PANTOPRAZOLE SODIUM SCH MG: 40 TABLET, DELAYED RELEASE ORAL at 05:14

## 2018-01-14 LAB
ALBUMIN SERPL-MCNC: 4.5 G/DL (ref 3–4.8)
ALBUMIN/GLOB SERPL: 1.4 {RATIO} (ref 1.1–1.8)
ALT SERPL-CCNC: 171 U/L (ref 7–56)
AST SERPL-CCNC: 160 U/L (ref 17–59)
BASOPHILS # BLD AUTO: 0.05 K/MM3 (ref 0–2)
BASOPHILS NFR BLD: 0.9 % (ref 0–3)
BUN SERPL-MCNC: 11 MG/DL (ref 7–21)
CALCIUM SERPL-MCNC: 10 MG/DL (ref 8.4–10.5)
EOSINOPHIL # BLD: 0.2 10*3/UL (ref 0–0.7)
EOSINOPHIL NFR BLD: 3.7 % (ref 1.5–5)
EOSINOPHIL NFR BLD: 4 % (ref 0–3)
ERYTHROCYTE [DISTWIDTH] IN BLOOD BY AUTOMATED COUNT: 12.6 % (ref 11.5–14.5)
GFR NON-AFRICAN AMERICAN: > 60
GRANULOCYTES # BLD: 2.16 10*3/UL (ref 1.4–6.5)
GRANULOCYTES NFR BLD: 39.9 % (ref 50–68)
HGB BLD-MCNC: 14.5 G/DL (ref 14–18)
LYMPHOCYTE: 24 % (ref 22–35)
LYMPHOCYTES # BLD: 1.7 10*3/UL (ref 1.2–3.4)
LYMPHOCYTES NFR BLD AUTO: 31.1 % (ref 22–35)
MCH RBC QN AUTO: 36.1 PG (ref 25–35)
MCHC RBC AUTO-ENTMCNC: 35.2 G/DL (ref 31–37)
MCV RBC AUTO: 102.5 FL (ref 80–105)
MONOCYTE: 22 % (ref 1–6)
MONOCYTES # BLD AUTO: 1.3 10*3/UL (ref 0.1–0.6)
MONOCYTES NFR BLD: 24.4 % (ref 1–6)
NEUTROPHILS NFR BLD AUTO: 50 % (ref 50–70)
PLATELET # BLD EST: NORMAL 10*3/UL
PLATELET # BLD: 165 10^3/UL (ref 120–450)
PMV BLD AUTO: 9.6 FL (ref 7–11)
RBC # BLD AUTO: 4.02 10^6/UL (ref 3.5–6.1)
WBC # BLD AUTO: 5.4 10^3/UL (ref 4.5–11)

## 2018-01-14 RX ADMIN — PANTOPRAZOLE SODIUM SCH: 40 TABLET, DELAYED RELEASE ORAL at 06:09

## 2018-01-14 RX ADMIN — THERA TABS SCH TAB: TAB at 09:16

## 2018-01-14 NOTE — CP.PCM.PCO
Addendum


Addendum: 


It is unclear why this providers initial consultation has not been transcribed 

yet. Patient was evaluated by this provider for depression however has also 

been agitated and confused on the unit likely secondary to combination of 

alcohol withdrawal and flu symptoms. PRNS have been ordered. Ativan should be 

given but minimized in favor of Geodon prn BID for agitation to prevent benzo 

delirium. 


Psychiatry will continue to f/u patient, case endorsed to Dr. Golden and 

Lulu OROZCO. 


Original Consultation Dictation Number is: [id number]


01/14/18 10:43

## 2018-01-15 LAB
ALBUMIN SERPL-MCNC: 4.1 G/DL (ref 3–4.8)
ALBUMIN/GLOB SERPL: 1.3 {RATIO} (ref 1.1–1.8)
ALT SERPL-CCNC: 157 U/L (ref 7–56)
AST SERPL-CCNC: 108 U/L (ref 17–59)
BASOPHILS # BLD AUTO: 0.03 K/MM3 (ref 0–2)
BASOPHILS NFR BLD: 0.5 % (ref 0–3)
BUN SERPL-MCNC: 10 MG/DL (ref 7–21)
CALCIUM SERPL-MCNC: 9.8 MG/DL (ref 8.4–10.5)
EOSINOPHIL # BLD: 0.5 10*3/UL (ref 0–0.7)
EOSINOPHIL NFR BLD: 7.9 % (ref 1.5–5)
ERYTHROCYTE [DISTWIDTH] IN BLOOD BY AUTOMATED COUNT: 12.3 % (ref 11.5–14.5)
GFR NON-AFRICAN AMERICAN: > 60
GRANULOCYTES # BLD: 2.91 10*3/UL (ref 1.4–6.5)
GRANULOCYTES NFR BLD: 50.2 % (ref 50–68)
HGB BLD-MCNC: 14.2 G/DL (ref 14–18)
LYMPHOCYTES # BLD: 1.3 10*3/UL (ref 1.2–3.4)
LYMPHOCYTES NFR BLD AUTO: 22 % (ref 22–35)
MCH RBC QN AUTO: 35.4 PG (ref 25–35)
MCHC RBC AUTO-ENTMCNC: 34.7 G/DL (ref 31–37)
MCV RBC AUTO: 102 FL (ref 80–105)
MONOCYTES # BLD AUTO: 1.1 10*3/UL (ref 0.1–0.6)
MONOCYTES NFR BLD: 19.4 % (ref 1–6)
PLATELET # BLD: 186 10^3/UL (ref 120–450)
PMV BLD AUTO: 9.3 FL (ref 7–11)
RBC # BLD AUTO: 4.01 10^6/UL (ref 3.5–6.1)
WBC # BLD AUTO: 5.8 10^3/UL (ref 4.5–11)

## 2018-01-15 RX ADMIN — TOBRAMYCIN SCH DROP: 3 OINTMENT OPHTHALMIC at 14:37

## 2018-01-15 RX ADMIN — PANTOPRAZOLE SODIUM SCH MG: 40 TABLET, DELAYED RELEASE ORAL at 05:01

## 2018-01-15 RX ADMIN — TOBRAMYCIN SCH DROP: 3 OINTMENT OPHTHALMIC at 22:42

## 2018-01-15 RX ADMIN — THERA TABS SCH TAB: TAB at 09:28

## 2018-01-15 RX ADMIN — TOBRAMYCIN SCH DROP: 3 OINTMENT OPHTHALMIC at 17:46

## 2018-01-15 NOTE — CON
DATE:



HISTORY OF PRESENT ILLNESS:  The patient is a 37-year-old single _____ born

male with psych history of depression, and anxiety, as well as alcohol

abuse, who has been treated on the unit for the flu.  Initially

psychiatrist was called, consulted upon the patient's request because of

anxiety symptoms.  This provider reviewed recent notes and met the patient

at bedside.  The patient is alert and oriented to month, year, location,

and circumstances.  Focus is fair.  Eye contact is good.  The patient

reports that he has been depressed with low mood and energy as well as

anxious and has had difficulty with sleep.  He cannot give me a time frame

of these symptoms, however, reports that he acknowledges he got help from

[facility]; however, the wait time is too long, so he

requested psychiatric followup on the medical floor.  He reports major

stressors of missing his family, for example, his mother who does not live

near him and other members of his family.  As noted, the patient does

report depression, low mood; however, he is hopeful about the future and

does not have any suicidal thoughts or thoughts to harm others.  Presently,

he is tired, affect is constricted and he is consistent with his responses,

which are relevant to question.  Regarding hallucinations, he does report

having them "once in a while," he sees people talking to him, these are

generally related to alcohol withdrawal.  Presently, he denied having any

hallucinations and delusions were not elicited.  Insight and judgment are

considered to be fair.



SOCIAL HISTORY:  The patient was born in [place].  He reportedly lives

with his girlfriend.  He is single.  He has 3 kids.  The patient initially

indicated that the patient is single; however, he is also  for last

11 years.  The patient denied any drug or alcohol issues to this provider;

though, later admitted that his hallucinations were related to the alcohol

use, which appears to be secondary to alcohol withdrawal and not

intoxication.  Social history elicited by other providers specifically Dr. Fragoso indicates that the patient endorsed daily alcohol use about 8 to 9,

24-ounce of beers per day and he had denied illicit drug use.  The patient

is also unemployed.



PSYCHIATRIC HISTORY:  The patient denies any psychiatric medication trials,

outpatient treatment or inpatient psychiatric treatment.  The patient is

interested in followup at [facility]; however, as

reported it has been difficult to obtain followup appointment, but it has

took too long.



IMPRESSION:  Alcohol use disorder, severe; alcohol withdrawal; mood

disorder, not otherwise specified; anxiety disorder, not otherwise

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



RELATIVE PSYCHIATRIC MEDICATIONS:  Include Ativan 2 mg IV q.4 hours

scheduled, Ativan 1 mg IV q.3 hours scheduled, and Geodon 20 mg IM b.i.d.

p.r.n. of which the patient received a dose this morning at 10:00 a.m.  The

patient has been anxious and restless and hallucinating after my visit. 

The patient received 3 doses of 2 mg of Ativan today thus far, 3 doses of

Ativan yesterday.



RECOMMENDATIONS:  I will discontinue Ativan 1 mg IV q.3 hours scheduled. 

This patient is already taking 2 mg IV q.4 hours.  I recommend that medical

team taper this medication as tolerated for the patient's alcohol

withdrawal.  The patient also should continue with Geodon 20 mg b.i.d.

p.r.n. for agitation very likely due to alcohol withdrawal.



We will hold off on any antidepressants at this time.  The patient has

severe alcohol problem.  We cannot rule out contribution of alcohol _____

hallucinating, antidepressant medications are not priority at this time. 

Psychiatry will continue to follow up with the patient every other day,

monitor his mood, his behavior, tolerance to the medications.  At this

time, it is unclear whether he is a candidate for psychiatric inpatient

unit due to his current alcohol withdrawal symptoms; however, he is not an

acute danger to himself or others due to his depression, though he can be

confused and uncooperative, which is standard with concurrent diagnosis of

delirium.





__________________________________________

Ibeth Hinson MD





DD:  01/13/2018 12:30:30

DT:  01/13/2018 21:38:51

Job # [id number]

## 2018-01-15 NOTE — PN
DATE:  01/15/2018



He is being seen today for a followup consultation.



PRESENTATION:  The patient is a 37-year-old  male, seen in his

hospital bed.  He has a one-to-one in attendance.  The patient is clear

today.  He indicates he is feeling well and probably will be discharged in

the next day or so.  The patient was admitted on 01/12/2018.  He came into

the hospital complaining of a cough, chills, and body aches.  He also had

stopped drinking 36 hours prior to coming into the hospital when he started

getting sick and subsequently was admitted, was put on isolation.  He has a

history of alcohol dependence and withdrawal.  He daily drinks 8-9 24-ounce

beers a day.  A consult was called due to anxiety and agitation.  The

patient has been in restraints, confused, disoriented, and yelling pretty

much most of his admission.  He has cleared at this point and he indicates

that he would like to have outpatient therapy along with a doctor to give

him medication for his depression.



PHYSICAL EXAMINATION:

CURRENT VITAL SIGNS:  Include temperature of 98.3, pulse rate of 88, blood

pressure of 135/102, respiratory rate of 20, and O2 saturation of 98%.



He continues on isolation for fall precautions; however, he is no longer in

restraints.  Nursing notes were reviewed.



CURRENT MEDICATIONS:  For his withdrawal folic acid, multivitamins,

thiamine 100 mg one p.o. daily, ziprasidone 20 mg IM b.i.d. as needed, and

he has been on lorazepam 2 mg IV push q.2 hours.  He is no longer shaky and

did not take his last dose, which would have been at 10:00 this morning.



MENTAL STATUS EXAMINATION:  The patient is alert and oriented x2.  He does

not know the exact date and time.  His eye contact is good.  His behavior

is pleasant and cooperative.  His speech rate and volume are within normal

limits.  His mood is euthymic.  His affect is full.  His thoughts are goal

directed.  He denies being suicidal or homicidal.  Denies presence of

hallucinations, delusions or paranoia.  His concentration and his focus he

reports are improving.  His memory both short and long term appears to be

adequate.  His appetite has improved and he indicates that he is sleeping

normally.



DIAGNOSTIC IMPRESSION:  Alcohol use disorder, chronic, ongoing, and severe;

alcohol withdrawal; depressive disorder, unspecified and hypertension.



PLAN:  He denies being suicidal or homicidal and appears in no imminent

danger for himself or others.  He indicates he will be discharged soon.  He

appears improving with his withdrawal symptoms from alcohol.  The patient is

requesting outpatient treatment for depression.  He would like medication

as well as therapy.  He was given a follow up referral in the [facility] for

him to follow up on.  The patient is pleased by this and stressed on to

follow through.  We will sign off on this patient.



Thank you for the consultation.





__________________________________________

PAM Mary





DD:  01/15/2018 10:10:14

DT:  01/15/2018 10:50:22

Job # [id number]



SOLE

## 2018-01-16 VITALS — OXYGEN SATURATION: 97 %

## 2018-01-16 VITALS
RESPIRATION RATE: 17 BRPM | HEART RATE: 77 BPM | SYSTOLIC BLOOD PRESSURE: 148 MMHG | TEMPERATURE: 98.2 F | DIASTOLIC BLOOD PRESSURE: 103 MMHG

## 2018-01-16 RX ADMIN — TOBRAMYCIN SCH APPL: 3 OINTMENT OPHTHALMIC at 11:02

## 2018-01-16 RX ADMIN — PANTOPRAZOLE SODIUM SCH MG: 40 TABLET, DELAYED RELEASE ORAL at 05:00

## 2018-01-16 RX ADMIN — THERA TABS SCH TAB: TAB at 10:26

## 2018-01-16 NOTE — CP.PCM.DIS
<Buddy Winter - Last Filed: 01/16/18 15:40>





Provider





- Provider


Date of Admission: 


01/12/18 05:04





Attending physician: 


Obdulio Irene MD





Primary care physician: 


NO PRIMARY CARE PROVIDER





Consults: 





Psych - Dr. Hinson


Time Spent in preparation of Discharge (in minutes): 45





Hospital Course





- Lab Results


Lab Results: 


 Most Recent Lab Values











WBC  5.8 10^3/ul (4.5-11.0)   01/15/18  05:20    


 


RBC  4.01 10^6/uL (3.5-6.1)   01/15/18  05:20    


 


Hgb  14.2 g/dL (14.0-18.0)   01/15/18  05:20    


 


Hct  40.9 % (42.0-52.0)  L  01/15/18  05:20    


 


MCV  102.0 fl (80.0-105.0)   01/15/18  05:20    


 


MCH  35.4 pg (25.0-35.0)  H  01/15/18  05:20    


 


MCHC  34.7 g/dl (31.0-37.0)   01/15/18  05:20    


 


RDW  12.3 % (11.5-14.5)   01/15/18  05:20    


 


Plt Count  186 10^3/uL (120.0-450.0)   01/15/18  05:20    


 


MPV  9.3 fl (7.0-11.0)   01/15/18  05:20    


 


Gran %  50.2 % (50.0-68.0)   01/15/18  05:20    


 


Lymph % (Auto)  22.0 % (22.0-35.0)   01/15/18  05:20    


 


Mono % (Auto)  19.4 % (1.0-6.0)  H  01/15/18  05:20    


 


Eos % (Auto)  7.9 % (1.5-5.0)  H  01/15/18  05:20    


 


Baso % (Auto)  0.5 % (0.0-3.0)   01/15/18  05:20    


 


Gran #  2.91  (1.4-6.5)   01/15/18  05:20    


 


Lymph #  1.3  (1.2-3.4)   01/15/18  05:20    


 


Mono #  1.1  (0.1-0.6)  H  01/15/18  05:20    


 


Eos #  0.5  (0.0-0.7)   01/15/18  05:20    


 


Baso #  0.03 K/mm3 (0.0-2.0)   01/15/18  05:20    


 


Neutrophils % (Manual)  50 % (50.0-70.0)   01/14/18  06:30    


 


Lymphocytes % (Manual)  24 % (22.0-35.0)   01/14/18  06:30    


 


Monocytes % (Manual)  22 % (1.0-6.0)  H  01/14/18  06:30    


 


Eosinophils % (Manual)  4 % (0.0-3.0)  H  01/14/18  06:30    


 


Platelet Evaluation  Normal  (NORMAL)   01/14/18  06:30    


 


Sodium  142 mmol/L (132-148)   01/15/18  05:20    


 


Potassium  3.9 mmol/L (3.6-5.0)   01/15/18  05:20    


 


Chloride  108 mmol/L ()  H  01/15/18  05:20    


 


Carbon Dioxide  21 mmol/L (21-33)   01/15/18  05:20    


 


Anion Gap  16  (10-20)   01/15/18  05:20    


 


BUN  10 mg/dL (7-21)   01/15/18  05:20    


 


Creatinine  0.7 mg/dl (0.8-1.5)  L  01/15/18  05:20    


 


Est GFR ( Amer)  > 60   01/15/18  05:20    


 


Est GFR (Non-Af Amer)  > 60   01/15/18  05:20    


 


Random Glucose  88 mg/dL ()   01/15/18  05:20    


 


Calcium  9.8 mg/dL (8.4-10.5)   01/15/18  05:20    


 


Phosphorus  4.0 mg/dL (2.5-4.5)   01/12/18  03:10    


 


Magnesium  1.9 mg/dL (1.7-2.2)   01/12/18  03:10    


 


Total Bilirubin  1.6 mg/dL (0.2-1.3)  H  01/15/18  05:20    


 


AST  108 U/L (17-59)  H D 01/15/18  05:20    


 


ALT  157 U/L (7-56)  H  01/15/18  05:20    


 


Alkaline Phosphatase  68 U/L ()   01/15/18  05:20    


 


Total Protein  7.3 g/dL (5.8-8.3)   01/15/18  05:20    


 


Albumin  4.1 g/dL (3.0-4.8)   01/15/18  05:20    


 


Globulin  3.2 gm/dL  01/15/18  05:20    


 


Albumin/Globulin Ratio  1.3  (1.1-1.8)   01/15/18  05:20    


 


Urine Color  Yellow  (YELLOW)   01/12/18  05:30    


 


Urine Appearance  Clear  (CLEAR)   01/12/18  05:30    


 


Urine pH  6.5  (4.7-8.0)   01/12/18  05:30    


 


Ur Specific Gravity  1.010  (1.005-1.035)   01/12/18  05:30    


 


Urine Protein  Negative mg/dL (<30 mg/dL)   01/12/18  05:30    


 


Urine Glucose (UA)  Negative mg/dL (NEGATIVE)   01/12/18  05:30    


 


Urine Ketones  Negative mg/dL (NEGATIVE)   01/12/18  05:30    


 


Urine Blood  Negative  (NEGATIVE)   01/12/18  05:30    


 


Urine Nitrate  Negative  (NEGATIVE)   01/12/18  05:30    


 


Urine Bilirubin  Negative  (NEGATIVE)   01/12/18  05:30    


 


Urine Urobilinogen  0.2 E.U./dL (<1 E.U./dL)   01/12/18  05:30    


 


Ur Leukocyte Esterase  Negative Ginny/uL (NEGATIVE)   01/12/18  05:30    


 


Urine Opiates Screen  Negative  (NEGATIVE)   01/12/18  05:30    


 


Urine Methadone Screen  Negative  (NEGATIVE)   01/12/18  05:30    


 


Ur Barbiturates Screen  Negative  (NEGATIVE)   01/12/18  05:30    


 


Ur Phencyclidine Scrn  Negative  (NEGATIVE)   01/12/18  05:30    


 


Ur Amphetamines Screen  Negative  (NEGATIVE)   01/12/18  05:30    


 


U Benzodiazepines Scrn  Negative  (NEGATIVE)   01/12/18  05:30    


 


U Oth Cocaine Metabols  Negative  (NEGATIVE)   01/12/18  05:30    


 


U Cannabinoids Screen  Negative  (NEGATIVE)   01/12/18  05:30    


 


Alcohol, Quantitative  < 10 mg/dL (0-10)   01/12/18  03:10    


 


Hepatitis A IgM Ab  Negative  (NEGATIVE)   01/12/18  03:10    


 


Hep Bs Antigen  Negative  (NEGATIVE)   01/12/18  03:10    


 


Hep B Core IgM Ab  Negative  (NEGATIVE)   01/12/18  03:10    


 


Hepatitis C Antibody  Negative  (NEGATIVE)   01/12/18  03:10    


 


Influenza Typ A,B (EIA)  Pos for influenza a  (NEGATIVE)  H  01/12/18  03:10    














- Hospital Course


Hospital Course: 





37 year old male with a PMH of HTN and alcohol abuse/withdrawal presented to 

the [facility] ED with non-productive cough, chills, and body aches for the past 36 

hours. The patient states that he took his temperature at home and reported a 

Tmax of 99 degrees. Patient denied having any sick contacts or any aggravating 

factors. Patient also endorsed that he is a chronic alcoholic and his last 

drink was also approximately 36 hours ago. Patient stated that he has untreated 

anxiety and requested that a psychiatrist come and speak with him regarding 

this issue. Patient denied suicidal or homicidal ideation, depression, headache

, changes in his vision, ear pain/discharge, sore throat, dysphagia, chest pain

, palpitations, SOB, abdominal pain, or N/V. In the ED, patient was found to be 

positive for influenza and given Tamiflu. Patient was admitted for evaluation 

and treatment for alcohol withdrawal and placed in isolation due to influenza 

infection. During hospitalization, patient was also seen by psychiatrist 

periodically for monitoring of mood/agitation. 


Today, the patient was seen at bedside, resting comfortably and in no acute 

distress. Patients withdrawal symptoms have improved. Patient stated that he is 

feeling much better after finishing the course of Tamiflu. Patient is also 

planning on following up with a psychiatrist outpatient to address his 

depression.  Patient was discussed at length with attending physician and will 

be discharged to home. Patient is to follow up with [facility] clinic within 3-5 days, 

specifically to follow up LFTs. Patient is to refrain from drinking any 

alcohol. Patient has been prescribed Librium for etoh withdrawals, take as 

prescribed. Patient is to follow up with Psych outpatient as per patient's 

wishes.








Discharge Exam





- Head Exam


Head Exam: NORMAL INSPECTION





- Eye Exam


Eye Exam: EOMI, Normal appearance, PERRL


Pupil Exam: NORMAL ACCOMODATION, PERRL





- ENT Exam


ENT Exam: Mucous Membranes Moist





- Respiratory Exam


Respiratory Exam: Clear to PA & Lateral, NORMAL BREATHING PATTERN, UNREMARKABLE





- Cardiovascular Exam


Cardiovascular Exam: REGULAR RHYTHM, +S1, +S2





- GI/Abdominal Exam


GI & Abdominal Exam: Normal Bowel Sounds, Soft.  absent: Tenderness





- Extremities Exam


Extremities exam: normal inspection





- Neurological Exam


Neurological exam: Alert, CN II-XII Intact, Normal Gait, Oriented x3, Reflexes 

Normal





- Psychiatric Exam


Psychiatric exam: Normal Affect, Normal Mood





- Skin


Skin Exam: Dry, Intact, Normal Color, Warm





Discharge Plan





- Discharge Medications


Prescriptions: 


chlordiazePOXIDE [Chlordiazepoxide HCl] 5 mg PO TID #10 cap


Multivit,Iron,Min 5/Folic Acid [Strovite Forte Caplet] 1 each PO DAILY #30 

tablet


Thiamine [Vitamin B1 Tab] 100 mg PO DAILY #30 tab





- Follow Up Plan


Condition: GUARDED


Disposition: HOME/ ROUTINE


Instructions:  Influenza (DC), Alcohol Intoxication (DC)


Additional Instructions: 


1. Patient is to follow up with [facility] clinic within 3-5 days, specifically to 

follow up LFTs.


2. Patient is to refrain from drinking any alcohol.


3. Patient has been prescribed Librium for etoh withdrawals, take as prescribed.


4. Patient is to follow up with Psych outpatient as per patient's wishes.


5. Patient is welcome to return to [facility] ED if symptoms changed or worsened.


Referrals: 


PCP,NO [Primary Care Provider] - 





<Obdulio Irene - Last Filed: 01/16/18 16:18>





Provider





- Provider


Date of Admission: 


01/12/18 05:04





Attending physician: 


Obdulio Irene MD





Primary care physician: 


STEFANIE PRIMARY CARE PROVIDER








Hospital Course





- Lab Results


Lab Results: 


 Most Recent Lab Values











WBC  5.8 10^3/ul (4.5-11.0)   01/15/18  05:20    


 


RBC  4.01 10^6/uL (3.5-6.1)   01/15/18  05:20    


 


Hgb  14.2 g/dL (14.0-18.0)   01/15/18  05:20    


 


Hct  40.9 % (42.0-52.0)  L  01/15/18  05:20    


 


MCV  102.0 fl (80.0-105.0)   01/15/18  05:20    


 


MCH  35.4 pg (25.0-35.0)  H  01/15/18  05:20    


 


MCHC  34.7 g/dl (31.0-37.0)   01/15/18  05:20    


 


RDW  12.3 % (11.5-14.5)   01/15/18  05:20    


 


Plt Count  186 10^3/uL (120.0-450.0)   01/15/18  05:20    


 


MPV  9.3 fl (7.0-11.0)   01/15/18  05:20    


 


Gran %  50.2 % (50.0-68.0)   01/15/18  05:20    


 


Lymph % (Auto)  22.0 % (22.0-35.0)   01/15/18  05:20    


 


Mono % (Auto)  19.4 % (1.0-6.0)  H  01/15/18  05:20    


 


Eos % (Auto)  7.9 % (1.5-5.0)  H  01/15/18  05:20    


 


Baso % (Auto)  0.5 % (0.0-3.0)   01/15/18  05:20    


 


Gran #  2.91  (1.4-6.5)   01/15/18  05:20    


 


Lymph #  1.3  (1.2-3.4)   01/15/18  05:20    


 


Mono #  1.1  (0.1-0.6)  H  01/15/18  05:20    


 


Eos #  0.5  (0.0-0.7)   01/15/18  05:20    


 


Baso #  0.03 K/mm3 (0.0-2.0)   01/15/18  05:20    


 


Neutrophils % (Manual)  50 % (50.0-70.0)   01/14/18  06:30    


 


Lymphocytes % (Manual)  24 % (22.0-35.0)   01/14/18  06:30    


 


Monocytes % (Manual)  22 % (1.0-6.0)  H  01/14/18  06:30    


 


Eosinophils % (Manual)  4 % (0.0-3.0)  H  01/14/18  06:30    


 


Platelet Evaluation  Normal  (NORMAL)   01/14/18  06:30    


 


Sodium  142 mmol/L (132-148)   01/15/18  05:20    


 


Potassium  3.9 mmol/L (3.6-5.0)   01/15/18  05:20    


 


Chloride  108 mmol/L ()  H  01/15/18  05:20    


 


Carbon Dioxide  21 mmol/L (21-33)   01/15/18  05:20    


 


Anion Gap  16  (10-20)   01/15/18  05:20    


 


BUN  10 mg/dL (7-21)   01/15/18  05:20    


 


Creatinine  0.7 mg/dl (0.8-1.5)  L  01/15/18  05:20    


 


Est GFR ( Amer)  > 60   01/15/18  05:20    


 


Est GFR (Non-Af Amer)  > 60   01/15/18  05:20    


 


Random Glucose  88 mg/dL ()   01/15/18  05:20    


 


Calcium  9.8 mg/dL (8.4-10.5)   01/15/18  05:20    


 


Phosphorus  4.0 mg/dL (2.5-4.5)   01/12/18  03:10    


 


Magnesium  1.9 mg/dL (1.7-2.2)   01/12/18  03:10    


 


Total Bilirubin  1.6 mg/dL (0.2-1.3)  H  01/15/18  05:20    


 


AST  108 U/L (17-59)  H D 01/15/18  05:20    


 


ALT  157 U/L (7-56)  H  01/15/18  05:20    


 


Alkaline Phosphatase  68 U/L ()   01/15/18  05:20    


 


Total Protein  7.3 g/dL (5.8-8.3)   01/15/18  05:20    


 


Albumin  4.1 g/dL (3.0-4.8)   01/15/18  05:20    


 


Globulin  3.2 gm/dL  01/15/18  05:20    


 


Albumin/Globulin Ratio  1.3  (1.1-1.8)   01/15/18  05:20    


 


Urine Color  Yellow  (YELLOW)   01/12/18  05:30    


 


Urine Appearance  Clear  (CLEAR)   01/12/18  05:30    


 


Urine pH  6.5  (4.7-8.0)   01/12/18  05:30    


 


Ur Specific Gravity  1.010  (1.005-1.035)   01/12/18  05:30    


 


Urine Protein  Negative mg/dL (<30 mg/dL)   01/12/18  05:30    


 


Urine Glucose (UA)  Negative mg/dL (NEGATIVE)   01/12/18  05:30    


 


Urine Ketones  Negative mg/dL (NEGATIVE)   01/12/18  05:30    


 


Urine Blood  Negative  (NEGATIVE)   01/12/18  05:30    


 


Urine Nitrate  Negative  (NEGATIVE)   01/12/18  05:30    


 


Urine Bilirubin  Negative  (NEGATIVE)   01/12/18  05:30    


 


Urine Urobilinogen  0.2 E.U./dL (<1 E.U./dL)   01/12/18  05:30    


 


Ur Leukocyte Esterase  Negative Ginny/uL (NEGATIVE)   01/12/18  05:30    


 


Urine Opiates Screen  Negative  (NEGATIVE)   01/12/18  05:30    


 


Urine Methadone Screen  Negative  (NEGATIVE)   01/12/18  05:30    


 


Ur Barbiturates Screen  Negative  (NEGATIVE)   01/12/18  05:30    


 


Ur Phencyclidine Scrn  Negative  (NEGATIVE)   01/12/18  05:30    


 


Ur Amphetamines Screen  Negative  (NEGATIVE)   01/12/18  05:30    


 


U Benzodiazepines Scrn  Negative  (NEGATIVE)   01/12/18  05:30    


 


U Oth Cocaine Metabols  Negative  (NEGATIVE)   01/12/18  05:30    


 


U Cannabinoids Screen  Negative  (NEGATIVE)   01/12/18  05:30    


 


Alcohol, Quantitative  < 10 mg/dL (0-10)   01/12/18  03:10    


 


Hepatitis A IgM Ab  Negative  (NEGATIVE)   01/12/18  03:10    


 


Hep Bs Antigen  Negative  (NEGATIVE)   01/12/18  03:10    


 


Hep B Core IgM Ab  Negative  (NEGATIVE)   01/12/18  03:10    


 


Hepatitis C Antibody  Negative  (NEGATIVE)   01/12/18  03:10    


 


Influenza Typ A,B (EIA)  Pos for influenza a  (NEGATIVE)  H  01/12/18  03:10    














Attending/Attestation





- Attestation


I have personally seen and examined this patient.: Yes


I have fully participated in the care of the patient.: Yes


I have reviewed all pertinent clinical information, including history, physical 

exam and plan: Yes


Notes (Text): 





01/16/18 16:17





Attending note;





Patient seen and examined with resident.





Patient is a 37-year-old  male well-known to our service secondary to 

chronic alcohol abuse.


 admitted for alcohol abuse and influenza.


patient completed 5 days course of Tamiflu. Currently afebrile and nontoxic.





Delirium tremens;  improved significantly. Patient is alert, awake. Oriented to 

place.


will be discharged home with low dose Librium.


complete alcohol cessation is strongly adviised.





Elevated LFTs; secondary to alcohol abuse. Improving slowly. Needs follow-up in 

one week.





Psychiatric evaluation appreciated.





Prognosis is poor secondary to continuous alcohol abuse and noncompliance with 

follow-up.





Upon discharge patient will be referred to [facility] clinic.

## 2023-09-23 NOTE — CP.PCM.PN
<Franco Justin - Last Filed: 01/14/18 11:20>





Subjective





- Date & Time of Evaluation


Date of Evaluation: 01/14/18


Time of Evaluation: 11:20





- Subjective


Subjective: 





Medicine Progress Note





Pt seen and examined at bedside. Pt remains on 1:1 and restraints for 

agitation. Pt still confused and disoriented. Unable to obtain ROS due to 

current mental status.





Objective





- Vital Signs/Intake and Output


Vital Signs (last 24 hours): 


 











Temp Pulse Resp BP Pulse Ox


 


 98.5 F   105 H  20   138/101 H  97 


 


 01/14/18 06:00  01/14/18 09:16  01/14/18 06:00  01/14/18 09:16  01/14/18 06:00








Intake and Output: 


 











 01/14/18 01/14/18





 06:59 18:59


 


Intake Total 2300 


 


Balance 2300 














- Medications


Medications: 


 Current Medications





Folic Acid (Folic Acid)  1 mg PO DAILY [facility]


   Last Admin: 01/14/18 09:16 Dose:  1 mg


Sodium Chloride (Sodium Chloride 0.9%)  1,000 mls @ 100 mls/hr IV .Q10H [facility]


   Last Admin: 01/14/18 06:11 Dose:  100 mls/hr


Lisinopril (Zestril)  10 mg PO DAILY [facility]


   Last Admin: 01/14/18 09:16 Dose:  10 mg


Lorazepam (Ativan)  2 mg IVP Q2H [facility]


   PRN Reason: Protocol


   Last Admin: 01/14/18 08:07 Dose:  2 mg


Multivitamins (Thera Tab)  1 tab PO DAILY [facility]


   Last Admin: 01/14/18 09:16 Dose:  1 tab


Ondansetron HCl (Zofran Inj)  4 mg IVP Q6H PRN


   PRN Reason: Nausea/Vomiting


Oseltamivir Phosphate (Tamiflu Cap)  75 mg PO 0600,1800 [facility]


   PRN Reason: Protocol


   Stop: 01/17/18 06:01


   Last Admin: 01/14/18 06:09 Dose:  Not Given


Pantoprazole Sodium (Protonix Ec Tab)  40 mg PO 0600 [facility]


   Last Admin: 01/14/18 06:09 Dose:  Not Given


Thiamine HCl (Vitamin B1 Tab)  100 mg PO DAILY [facility]


   Last Admin: 01/14/18 09:16 Dose:  100 mg


Ziprasidone (Geodon Inj)  20 mg IM BID PRN; Protocol


   PRN Reason: Agitation


   Last Admin: 01/13/18 21:46 Dose:  20 mg











- Labs


Labs: 


 





 01/14/18 06:30 





 01/14/18 06:30 











- Constitutional


Appears: No Acute Distress





- Head Exam


Head Exam: NORMAL INSPECTION





- Eye Exam


Eye Exam: Normal appearance





- ENT Exam


ENT Exam: Normal Exam





- Neck Exam


Neck Exam: Normal Inspection





- Respiratory Exam


Respiratory Exam: Clear to Ausculation Bilateral.  absent: Rales, Rhonchi, 

Wheezes





- Cardiovascular Exam


Cardiovascular Exam: RRR, +S1, +S2.  absent: Gallop, Rubs, Murmur





- GI/Abdominal Exam


GI & Abdominal Exam: Soft.  absent: Distended, Guarding, Tenderness, Rebound





- Extremities Exam


Additional comments: 





Tremors UE b/l





- Back Exam


Back Exam: NORMAL INSPECTION





- Neurological Exam


Neurological Exam: Alert, Awake.  absent: Oriented x3





- Psychiatric Exam


Psychiatric exam: Normal Affect, Normal Mood





- Skin


Skin Exam: Dry, Intact, Normal Color, Warm





Assessment and Plan





- Assessment and Plan (Free Text)


Assessment: 





37 year old male with a past medical history significant for HTN and alcohol 

abuse/withdrawal who presents with non-productive cough, chills, and body aches 

for approximately the past 36 hours. In the ED, patient tested positive for 

influenza and was given one dose of Tamiflu. He was admitted for evaluation and 

treatment for alcohol withdrawal and influenza infection.





Plan: 





1. Influenza


- Tested positive for influenza


- Cont Tamiflu 75 mg PO BID day 3 of 5





2. Alcohol Abuse/Withdrawal


- UDS and EtOH negative


- Ativan 2 mg IVP Q4H scheduled


- Geodon 20 mg IM BID prn for agitation


- Zofran 4mg IVP Q6H PRN


- Thiamine, folate, multivitamin PO


- CIWA


- Fall, aspiration and seizure precautions in place


- 1:1, restraints





3. Transaminitis


- Hepatitis panel negative


- Downtrending, likely 2/2 EtOH abuse


- Cont to monitor





4. History of HTN


- Continue home Lisinopril


- Heart Healthy Diet





5. Anxiety


- Psych consult





GI Prophylaxis: Protonix


DVT Prophylaxis: SCD's





Pt seen and discussed in detail with Dr. Irene.


Andrews Justin, PGY1





<Obdulio Irene - Last Filed: 01/14/18 11:40>





Objective





- Vital Signs/Intake and Output


Vital Signs (last 24 hours): 


 











Temp Pulse Resp BP Pulse Ox


 


 98.5 F   105 H  20   138/101 H  97 


 


 01/14/18 06:00  01/14/18 09:16  01/14/18 06:00  01/14/18 09:16  01/14/18 06:00








Intake and Output: 


 











 01/14/18 01/14/18





 06:59 18:59


 


Intake Total 2300 


 


Balance 2300 














- Medications


Medications: 


 Current Medications





Folic Acid (Folic Acid)  1 mg PO DAILY [facility]


   Last Admin: 01/14/18 09:16 Dose:  1 mg


Sodium Chloride (Sodium Chloride 0.9%)  1,000 mls @ 100 mls/hr IV .Q10H [facility]


   Last Admin: 01/14/18 06:11 Dose:  100 mls/hr


Lisinopril (Zestril)  10 mg PO DAILY [facility]


   Last Admin: 01/14/18 09:16 Dose:  10 mg


Lorazepam (Ativan)  2 mg IVP Q2H [facility]


   PRN Reason: Protocol


   Last Admin: 01/14/18 08:07 Dose:  2 mg


Multivitamins (Thera Tab)  1 tab PO DAILY [facility]


   Last Admin: 01/14/18 09:16 Dose:  1 tab


Ondansetron HCl (Zofran Inj)  4 mg IVP Q6H PRN


   PRN Reason: Nausea/Vomiting


Oseltamivir Phosphate (Tamiflu Cap)  75 mg PO 0600,1800 [facility]


   PRN Reason: Protocol


   Stop: 01/17/18 06:01


   Last Admin: 01/14/18 06:09 Dose:  Not Given


Pantoprazole Sodium (Protonix Ec Tab)  40 mg PO 0600 [facility]


   Last Admin: 01/14/18 06:09 Dose:  Not Given


Thiamine HCl (Vitamin B1 Tab)  100 mg PO DAILY [facility]


   Last Admin: 01/14/18 09:16 Dose:  100 mg


Ziprasidone (Geodon Inj)  20 mg IM BID PRN; Protocol


   PRN Reason: Agitation


   Last Admin: 01/13/18 21:46 Dose:  20 mg











- Labs


Labs: 


 





 01/14/18 06:30 





 01/14/18 06:30 











Attending/Attestation





- Attestation


I have personally seen and examined this patient.: Yes


I have fully participated in the care of the patient.: Yes


I have reviewed all pertinent clinical information, including history, physical 

exam and plan: Yes


Notes (Text): 





01/14/18 11:37





Attending note;





Patient seen and examined with resident.





Patient is a 37-year-old  male well-known to our service secondary to 

chronic alcohol abuse.


Currently getting admitted for alcohol abuse and influenza. Continue Tamiflu. 

On respiratory isolation.





Delirium tremens;  still with severe confusion .


Continue seizure protocol. Increase Ativan 2 mg. continue Geodon when necessary.


Psychiatric evaluation appreciated.


One-to-one observation.


Continue restraints for patient's safety.


Continue IV fluids.


Monitor closely.





Prognosis is poor secondary to continuous alcohol abuse and noncompliance with 

follow-up.





Upon discharge patient will be referred to [facility] [place] [facility].
<Jerrod Avery - Last Filed: 01/15/18 14:55>





Subjective





- Date & Time of Evaluation


Date of Evaluation: 01/15/18


Time of Evaluation: 07:21





- Subjective


Subjective: 





Jerrod Avery PGY1 IM Progress Note for Dr. Irene Hospitalist Service





Patient was seen and examined the bedside. He denied any acute overnight events 

or any complaints. He is alert, oriented, and able to carry a full coherent 

conversation. The patient states that he is less agitated and experiences less 

tremors. He was educated about alcohol cessation, but the patient finds it 

difficult to stop. The patient denies chest pain, shortness of breath, cough, 

fevers/chills, n/v/d, headaches, changes in vision/hearing, tinnitus, dizziness

, fatigue, weakness, abdominal pain, changes in urine or bowel habits. 





Objective





- Vital Signs/Intake and Output


Vital Signs (last 24 hours): 


 











Temp Pulse Resp BP Pulse Ox


 


 98.3 F   88   20   135/102 H  98 


 


 01/15/18 06:00  01/15/18 06:00  01/15/18 06:00  01/15/18 06:00  01/15/18 06:00








Intake and Output: 


 











 01/15/18 01/15/18





 06:59 18:59


 


Intake Total  2400


 


Balance  2400














- Medications


Medications: 


 Current Medications





Folic Acid (Folic Acid)  1 mg PO DAILY [facility]


   Last Admin: 01/14/18 09:16 Dose:  1 mg


Sodium Chloride (Sodium Chloride 0.9%)  1,000 mls @ 100 mls/hr IV .Q10H [facility]


   Last Admin: 01/15/18 01:55 Dose:  100 mls/hr


Lisinopril (Zestril)  10 mg PO DAILY [facility]


   Last Admin: 01/14/18 09:16 Dose:  10 mg


Lorazepam (Ativan)  2 mg IVP Q2H [facility]


   PRN Reason: Protocol


   Last Admin: 01/15/18 08:12 Dose:  2 mg


Multivitamins (Thera Tab)  1 tab PO DAILY [facility]


   Last Admin: 01/14/18 09:16 Dose:  1 tab


Ondansetron HCl (Zofran Inj)  4 mg IVP Q6H PRN


   PRN Reason: Nausea/Vomiting


Oseltamivir Phosphate (Tamiflu Cap)  75 mg PO 0600,1800 [facility]


   PRN Reason: Protocol


   Stop: 01/17/18 06:01


   Last Admin: 01/15/18 05:01 Dose:  75 mg


Pantoprazole Sodium (Protonix Ec Tab)  40 mg PO 0600 [facility]


   Last Admin: 01/15/18 05:01 Dose:  40 mg


Thiamine HCl (Vitamin B1 Tab)  100 mg PO DAILY [facility]


   Last Admin: 01/14/18 09:16 Dose:  100 mg


Ziprasidone (Geodon Inj)  20 mg IM BID PRN; Protocol


   PRN Reason: Agitation


   Last Admin: 01/13/18 21:46 Dose:  20 mg











- Labs


Labs: 


 





 01/15/18 05:20 





 01/15/18 05:20 











- Constitutional


Appears: Well, Non-toxic, No Acute Distress





- Head Exam


Head Exam: NORMAL INSPECTION





- Eye Exam


Eye Exam: EOMI, Normal appearance, PERRL





- ENT Exam


ENT Exam: Normal Exam





- Neck Exam


Neck Exam: Full ROM, Normal Inspection





- Respiratory Exam


Respiratory Exam: Clear to Ausculation Bilateral, NORMAL BREATHING PATTERN.  

absent: Rales, Rhonchi, Wheezes





- Cardiovascular Exam


Cardiovascular Exam: RRR, +S1, +S2





- GI/Abdominal Exam


GI & Abdominal Exam: Soft, Normal Bowel Sounds.  absent: Distended, Tenderness





- Extremities Exam


Extremities Exam: Full ROM, Normal Inspection.  absent: Pedal Edema





- Back Exam


Back Exam: NORMAL INSPECTION





- Neurological Exam


Neurological Exam: Alert, Awake, CN II-XII Intact, Oriented x3





- Psychiatric Exam


Psychiatric exam: Normal Affect, Normal Mood





- Skin


Skin Exam: Normal Color, Warm





Assessment and Plan





- Assessment and Plan (Free Text)


Assessment: 





37 year old male with a past medical history significant for HTN and alcohol 

abuse/severe withdrawal who presents with non-productive cough, chills, and 

body aches for x2 days. In the ED, patient tested positive for influenza and 

finished course of Tamiflu. He was admitted for evaluation and treatment for 

alcohol withdrawal and influenza infection. Patient experienced DT's but is 

currently improving, requiring less PRN meds. 





Plan: 





1. Influenza


- s/p treatment


- improved, but patient remains on isolation 





2. Alcohol Abuse/Withdrawal


- Ativan switched 2 mg IVP Q4 PRN ETOH withdrawals 


- Geodon 20 mg IM BID prn for agitation


- Zofran 4mg IVP Q6H PRN


- Thiamine, folate, multivitamin PO


- CIWA


- Fall, aspiration and seizure precautions in place


- 1:1, restraints have been d/c 


- d/c telemetry





3. Transaminitis


- Hepatitis panel negative


- Downtrending, likely 2/2 EtOH abuse


- Cont to monitor





4. History of HTN


- Continue home Lisinopril


- Heart Healthy Diet





5. Anxiety


- Psych consult





6. GI/DVT PPX


Protonix/SCD's





The patient was seen, examined and discussed with attending, Dr. Teto Avery PGY1


Pager # [phone] 





<Obdulio Irene - Last Filed: 01/15/18 16:42>





Objective





- Vital Signs/Intake and Output


Vital Signs (last 24 hours): 


 











Temp Pulse Resp BP Pulse Ox


 


 97.9 F   75   18   150/112 H  98 


 


 01/15/18 12:00  01/15/18 12:00  01/15/18 12:00  01/15/18 12:00  01/15/18 06:00








Intake and Output: 


 











 01/15/18 01/15/18





 06:59 18:59


 


Intake Total  2400


 


Balance  2400














- Medications


Medications: 


 Current Medications





Folic Acid (Folic Acid)  1 mg PO DAILY [facility]


   Last Admin: 01/15/18 09:28 Dose:  1 mg


Sodium Chloride (Sodium Chloride 0.9%)  1,000 mls @ 100 mls/hr IV .Q10H [facility]


   Last Admin: 01/15/18 11:13 Dose:  100 mls/hr


Lisinopril (Zestril)  10 mg PO DAILY [facility]


   Last Admin: 01/15/18 09:28 Dose:  10 mg


Lorazepam (Ativan)  2 mg IVP Q4H PRN; Protocol


   PRN Reason: Symptoms of alcohol withdrawl


Multivitamins (Thera Tab)  1 tab PO DAILY [facility]


   Last Admin: 01/15/18 09:28 Dose:  1 tab


Ondansetron HCl (Zofran Inj)  4 mg IVP Q6H PRN


   PRN Reason: Nausea/Vomiting


Oseltamivir Phosphate (Tamiflu Cap)  75 mg PO 0600,1800 [facility]


   PRN Reason: Protocol


   Stop: 01/17/18 06:01


   Last Admin: 01/15/18 05:01 Dose:  75 mg


Pantoprazole Sodium (Protonix Ec Tab)  40 mg PO 0600 [facility]


   Last Admin: 01/15/18 05:01 Dose:  40 mg


Thiamine HCl (Vitamin B1 Tab)  100 mg PO DAILY [facility]


   Last Admin: 01/15/18 09:28 Dose:  100 mg


Tobramycin Sulfate (Tobrex 0.3% Ophth Oint)  1 appl OU QID [facility]


   Last Admin: 01/15/18 14:37 Dose:  1 drop


Ziprasidone (Geodon Inj)  20 mg IM BID PRN; Protocol


   PRN Reason: Agitation


   Last Admin: 01/13/18 21:46 Dose:  20 mg











- Labs


Labs: 


 





 01/15/18 05:20 





 01/15/18 05:20 











Attending/Attestation





- Attestation


I have personally seen and examined this patient.: Yes


I have fully participated in the care of the patient.: Yes


I have reviewed all pertinent clinical information, including history, physical 

exam and plan: Yes


Notes (Text): 





01/15/18 16:41





Attending note;





Patient seen and examined with resident.





Patient is a 37-year-old  male well-known to our service secondary to 

chronic alcohol abuse.


Currently getting admitted for alcohol abuse and influenza. Continue Tamiflu. 

On respiratory isolation.





Delirium tremens;  improved significantly. Patient is alert, awake. Oriented to 

place.


Continue seizure protocol.  on Ativan and  Geodon when necessary.


Psychiatric evaluation appreciated.


One-to-one observation will be discontinued.





Prognosis is poor secondary to continuous alcohol abuse and noncompliance with 

follow-up.





Upon discharge patient will be referred to [facility] [place] [facility].
<MargothFranco - Last Filed: 01/13/18 12:16>





Subjective





- Date & Time of Evaluation


Date of Evaluation: 01/13/18


Time of Evaluation: 12:17





- Subjective


Subjective: 





Medicine Progress Note





Pt seen and examined at bedside. Pt agitated overnight requiring prn ativan. Pt 

still confused and combative. ROS limited due to patient's current state.





Objective





- Vital Signs/Intake and Output


Vital Signs (last 24 hours): 


 











Temp Pulse Resp BP Pulse Ox


 


 97.8 F   145 H  19   118/77   98 


 


 01/13/18 06:00  01/13/18 09:46  01/13/18 06:00  01/13/18 09:46  01/13/18 06:00








Intake and Output: 


 











 01/13/18 01/13/18





 06:59 18:59


 


Intake Total 700 


 


Balance 700 














- Medications


Medications: 


 Current Medications





Folic Acid (Folic Acid)  1 mg PO DAILY [facility]


   Last Admin: 01/13/18 09:46 Dose:  1 mg


Sodium Chloride (Sodium Chloride 0.9%)  1,000 mls @ 100 mls/hr IV .Q10H [facility]


   Last Admin: 01/12/18 22:16 Dose:  100 mls/hr


Lisinopril (Zestril)  10 mg PO DAILY [facility]


   Last Admin: 01/13/18 09:46 Dose:  10 mg


Lorazepam (Ativan)  2 mg IVP Q4H [facility]


   PRN Reason: Protocol


   Last Admin: 01/13/18 08:15 Dose:  2 mg


Lorazepam (Ativan)  1 mg IVP Q3 [facility]


   PRN Reason: Protocol


Multivitamins (Thera Tab)  1 tab PO DAILY [facility]


   Last Admin: 01/13/18 09:46 Dose:  1 tab


Ondansetron HCl (Zofran Inj)  4 mg IVP Q6H PRN


   PRN Reason: Nausea/Vomiting


Oseltamivir Phosphate (Tamiflu Cap)  75 mg PO 0600,1800 [facility]


   PRN Reason: Protocol


   Stop: 01/17/18 06:01


   Last Admin: 01/13/18 05:14 Dose:  75 mg


Pantoprazole Sodium (Protonix Ec Tab)  40 mg PO 0600 [facility]


   Last Admin: 01/13/18 05:14 Dose:  40 mg


Thiamine HCl (Vitamin B1 Tab)  100 mg PO DAILY [facility]


   Last Admin: 01/13/18 09:46 Dose:  100 mg


Ziprasidone (Geodon Inj)  20 mg IM BID PRN; Protocol


   PRN Reason: Agitation


   Last Admin: 01/13/18 10:05 Dose:  20 mg











- Labs


Labs: 


 





 01/13/18 06:30 





 01/13/18 06:15 











- Constitutional


Appears: Confused





- Head Exam


Head Exam: NORMAL INSPECTION





- Eye Exam


Eye Exam: Normal appearance





- ENT Exam


ENT Exam: Normal Exam





- Neck Exam


Neck Exam: Normal Inspection





- Respiratory Exam


Respiratory Exam: Clear to Ausculation Bilateral.  absent: Rales, Rhonchi, 

Wheezes





- Cardiovascular Exam


Cardiovascular Exam: RRR, +S1, +S2.  absent: Gallop, Rubs, Murmur





- GI/Abdominal Exam


GI & Abdominal Exam: Soft.  absent: Distended, Guarding, Tenderness, Rebound





- Extremities Exam


Extremities Exam: Normal Inspection





- Back Exam


Back Exam: NORMAL INSPECTION





- Neurological Exam


Neurological Exam: Awake





- Psychiatric Exam


Psychiatric exam: Agitated





- Skin


Skin Exam: Dry, Intact, Normal Color, Warm





Assessment and Plan





- Assessment and Plan (Free Text)


Assessment: 





37 year old male with a past medical history significant for HTN and alcohol 

abuse/withdrawal who presents with non-productive cough, chills, and body aches 

for approximately the past 36 hours. In the ED, patient tested positive for 

influenza and was given one dose of Tamiflu. He was admitted for evaluation and 

treatment for alcohol withdrawal and influenza infection.





Plan: 





1. Influenza


-Tested positive for influenza


-Cont Tamiflu 75mg PO BID day 2 of 5





2. Alcohol Abuse/Withdrawal


-UDS and EtOH negative


-Ativan 2 mg IVP Q4H scheduled


-Geodon 20 mg IM BID prn for agitation


-Zofran 4mg IVP Q6H PRN


-Thiamine, folate, multivitamin PO


-CIWA


-Fall, aspiration and seizure precautions in place


-1:1, restraints





3. Transaminitis


-Hepatitis panel negative


-Downtrending, likely 2/2 EtOH abuse


-Cont to monitor





4. History of HTN


-Continue home Lisinopril


-Heart Healthy Diet





5. Anxiety


-Psych consult





GI Prophylaxis: Protonix


DVT Prophylaxis: SCD's





Pt seen and discussed in detail with Dr. Irene.


Andrews Justin, PGY1





<Obdulio Irene - Last Filed: 01/13/18 15:03>





Objective





- Vital Signs/Intake and Output


Vital Signs (last 24 hours): 


 











Temp Pulse Resp BP Pulse Ox


 


 98.9 F   126 H  20   156/105 H  98 


 


 01/13/18 12:00  01/13/18 12:00  01/13/18 12:00  01/13/18 12:00  01/13/18 06:00








Intake and Output: 


 











 01/13/18 01/13/18





 06:59 18:59


 


Intake Total 700 


 


Balance 700 














- Medications


Medications: 


 Current Medications





Folic Acid (Folic Acid)  1 mg PO DAILY [facility]


   Last Admin: 01/13/18 09:46 Dose:  1 mg


Sodium Chloride (Sodium Chloride 0.9%)  1,000 mls @ 100 mls/hr IV .Q10H [facility]


   Last Admin: 01/13/18 12:00 Dose:  100 mls/hr


Lisinopril (Zestril)  10 mg PO DAILY [facility]


   Last Admin: 01/13/18 09:46 Dose:  10 mg


Lorazepam (Ativan)  2 mg IVP Q4H [facility]


   PRN Reason: Protocol


   Last Admin: 01/13/18 14:14 Dose:  2 mg


Multivitamins (Thera Tab)  1 tab PO DAILY [facility]


   Last Admin: 01/13/18 09:46 Dose:  1 tab


Ondansetron HCl (Zofran Inj)  4 mg IVP Q6H PRN


   PRN Reason: Nausea/Vomiting


Oseltamivir Phosphate (Tamiflu Cap)  75 mg PO 0600,1800 [facility]


   PRN Reason: Protocol


   Stop: 01/17/18 06:01


   Last Admin: 01/13/18 05:14 Dose:  75 mg


Pantoprazole Sodium (Protonix Ec Tab)  40 mg PO 0600 [facility]


   Last Admin: 01/13/18 05:14 Dose:  40 mg


Thiamine HCl (Vitamin B1 Tab)  100 mg PO DAILY [facility]


   Last Admin: 01/13/18 09:46 Dose:  100 mg


Ziprasidone (Geodon Inj)  20 mg IM BID PRN; Protocol


   PRN Reason: Agitation


   Last Admin: 01/13/18 10:05 Dose:  20 mg











- Labs


Labs: 


 





 01/13/18 06:30 





 01/13/18 06:15 











Attending/Attestation





- Attestation


I have personally seen and examined this patient.: Yes


I have fully participated in the care of the patient.: Yes


I have reviewed all pertinent clinical information, including history, physical 

exam and plan: Yes


Notes (Text): 





01/13/18 14:55





Attending note;





Patient seen and examined with resident.





Patient is a 37-year-old  male well-known to our service secondary to 

chronic alcohol abuse.


Currently getting admitted for alcohol abuse and influenza. Continue Tamiflu. 

On respiratory isolation.





Alcohol abuse; patient goes into alcohol withdrawal with severe confusion. 

Monitor closely.


Continue seizure protocol. Increase Ativan 2 mg every 2 when necessary. Got 1 

dose of Geodon.


Psychiatric evaluation requested.


One-to-one observation.


Continue restraints for patient's safety.


Continue IV fluids.


Monitor closely.





Prognosis is poor secondary to continuous alcohol abuse and noncompliance with 

follow-up.





Upon discharge patient will be referred to [facility] [place] [facility].
1